# Patient Record
Sex: FEMALE | Race: WHITE | NOT HISPANIC OR LATINO
[De-identification: names, ages, dates, MRNs, and addresses within clinical notes are randomized per-mention and may not be internally consistent; named-entity substitution may affect disease eponyms.]

---

## 2017-07-30 ENCOUNTER — FORM ENCOUNTER (OUTPATIENT)
Age: 47
End: 2017-07-30

## 2017-08-08 ENCOUNTER — FORM ENCOUNTER (OUTPATIENT)
Age: 47
End: 2017-08-08

## 2018-04-25 ENCOUNTER — FORM ENCOUNTER (OUTPATIENT)
Age: 48
End: 2018-04-25

## 2018-06-21 ENCOUNTER — FORM ENCOUNTER (OUTPATIENT)
Age: 48
End: 2018-06-21

## 2019-02-05 ENCOUNTER — FORM ENCOUNTER (OUTPATIENT)
Age: 49
End: 2019-02-05

## 2019-05-19 ENCOUNTER — FORM ENCOUNTER (OUTPATIENT)
Age: 49
End: 2019-05-19

## 2019-07-16 ENCOUNTER — FORM ENCOUNTER (OUTPATIENT)
Age: 49
End: 2019-07-16

## 2019-07-22 ENCOUNTER — FORM ENCOUNTER (OUTPATIENT)
Age: 49
End: 2019-07-22

## 2019-07-28 ENCOUNTER — FORM ENCOUNTER (OUTPATIENT)
Age: 49
End: 2019-07-28

## 2019-08-08 ENCOUNTER — FORM ENCOUNTER (OUTPATIENT)
Age: 49
End: 2019-08-08

## 2019-08-19 ENCOUNTER — FORM ENCOUNTER (OUTPATIENT)
Age: 49
End: 2019-08-19

## 2019-08-20 ENCOUNTER — FORM ENCOUNTER (OUTPATIENT)
Age: 49
End: 2019-08-20

## 2019-08-22 ENCOUNTER — FORM ENCOUNTER (OUTPATIENT)
Age: 49
End: 2019-08-22

## 2019-08-26 ENCOUNTER — FORM ENCOUNTER (OUTPATIENT)
Age: 49
End: 2019-08-26

## 2019-08-28 ENCOUNTER — FORM ENCOUNTER (OUTPATIENT)
Age: 49
End: 2019-08-28

## 2019-09-02 ENCOUNTER — FORM ENCOUNTER (OUTPATIENT)
Age: 49
End: 2019-09-02

## 2019-09-04 ENCOUNTER — FORM ENCOUNTER (OUTPATIENT)
Age: 49
End: 2019-09-04

## 2020-05-31 ENCOUNTER — FORM ENCOUNTER (OUTPATIENT)
Age: 50
End: 2020-05-31

## 2020-09-29 ENCOUNTER — FORM ENCOUNTER (OUTPATIENT)
Age: 50
End: 2020-09-29

## 2021-02-23 PROBLEM — Z00.00 ENCOUNTER FOR PREVENTIVE HEALTH EXAMINATION: Status: ACTIVE | Noted: 2021-02-23

## 2021-02-24 ENCOUNTER — NON-APPOINTMENT (OUTPATIENT)
Age: 51
End: 2021-02-24

## 2021-02-25 ENCOUNTER — NON-APPOINTMENT (OUTPATIENT)
Age: 51
End: 2021-02-25

## 2021-02-26 ENCOUNTER — NON-APPOINTMENT (OUTPATIENT)
Age: 51
End: 2021-02-26

## 2021-03-01 ENCOUNTER — NON-APPOINTMENT (OUTPATIENT)
Age: 51
End: 2021-03-01

## 2021-03-18 ENCOUNTER — APPOINTMENT (OUTPATIENT)
Dept: BREAST CENTER | Facility: CLINIC | Age: 51
End: 2021-03-18

## 2021-04-15 ENCOUNTER — APPOINTMENT (OUTPATIENT)
Dept: BREAST CENTER | Facility: CLINIC | Age: 51
End: 2021-04-15

## 2021-05-06 ENCOUNTER — APPOINTMENT (OUTPATIENT)
Dept: BREAST CENTER | Facility: CLINIC | Age: 51
End: 2021-05-06

## 2021-07-02 ENCOUNTER — NON-APPOINTMENT (OUTPATIENT)
Age: 51
End: 2021-07-02

## 2021-07-07 ENCOUNTER — NON-APPOINTMENT (OUTPATIENT)
Age: 51
End: 2021-07-07

## 2021-07-07 ENCOUNTER — APPOINTMENT (OUTPATIENT)
Dept: BREAST CENTER | Facility: CLINIC | Age: 51
End: 2021-07-07

## 2021-07-21 ENCOUNTER — APPOINTMENT (OUTPATIENT)
Dept: BREAST CENTER | Facility: CLINIC | Age: 51
End: 2021-07-21
Payer: MEDICAID

## 2021-07-21 ENCOUNTER — TRANSCRIPTION ENCOUNTER (OUTPATIENT)
Age: 51
End: 2021-07-21

## 2021-07-21 DIAGNOSIS — R92.8 OTHER ABNORMAL AND INCONCLUSIVE FINDINGS ON DIAGNOSTIC IMAGING OF BREAST: ICD-10-CM

## 2021-07-21 DIAGNOSIS — R92.1 MAMMOGRAPHIC CALCIFICATION FOUND ON DIAGNOSTIC IMAGING OF BREAST: ICD-10-CM

## 2021-07-21 PROCEDURE — 99213 OFFICE O/P EST LOW 20 MIN: CPT

## 2021-07-21 RX ORDER — CHROMIUM 200 MCG
TABLET ORAL
Refills: 0 | Status: ACTIVE | COMMUNITY

## 2021-07-25 NOTE — ASSESSMENT
[FreeTextEntry1] : 50yo female previously seen by Dr. Burris last seen by Dr. Tolbert 9/30/20 s/p L lumpx & SNBX 8/9/19 for 1.2cm IDC ER/[place]+, HER2(-), (0/3) LN, (+) superior margin, s/p L Re-excision - no residual carcinoma, Oncotype 9, no RT or AI; FHx probable breast ca mother 39. L DXMG 2/22/21 showed increased nonlayering L calcifications for which stereo bx was recommended and not performed f/u [facility] & US 6/24/21 showed the microcalcs in two areas were pleomorphic in distribution and had increased in size with UOQ microcalcs measuring 5cm x 5cm and posterior cluster at 3:00 measuring 2.5cm. Stereo bx was once again recommended.  \par \par Informed patient about recommendation for biopsy. Patient asked if we could perform the biopsy in office today but informed her that the type of biopsy that was recommended is a stereotactic bx and must be performed by a radiologist with mammographic guidance. Expressed the urgency of the matter to the patient especially given her personal history of breast cancer. Patient states that she will proceed with the biopsy but does however note that she is going to be away for the month of August and the majority of September so she will get it done in the end of September 2021. Informed patient that biopsy must be performed in order for us to know whether or not she has a new breast cancer and discussed the gravity of the situation. Patient verbalized understanding and states that she will proceed with the bx as instructed. \par \par Patient is to have L Stereo bx and return after bx for reexamination.\par \par Patient was previously sent two certified letters informing her that she had not proceeded with recommended follow up. Patient was upset about having received mail to her home address and would like to no longer receive letters directly to her address because she lives in an apartment building and is afraid that someone else will receive her mail. Patient noted that if results must be sent to her that she would prefer if they were sent to her PCP Dr. Ivonne Coleman (Office # [phone] and Fax# [phone].

## 2021-07-25 NOTE — DATA REVIEWED
[FreeTextEntry1] : 7/17/19: L US bx 11:00: 0.1cm IDC well differentiated ER+ (90%), MT+ (90%), HER2 1+ (-)  \par \par 8/9/19: L NLoc Lump & SNBx:1.2cm IDC well differentiated, multiple coalescing foci, LCIS, classic type/DEEP II, ADH, FEA, ER+ (90%), MT+ (90%), HER2 1+ (-); (0/3) LN (+) superior margin, 0.7cm from medial margin, 1cm from remaining margin  \par \par 8/23/19: L Re-excision Lumpx: no residual carcinoma  \par \par 6/1/20: [facility] & US: MG - L - central upper post-op changes, stable probably benign central and UOQ grouped calcs; US - L - 0.4cm cyst w/ inspissated debris 12:30 4FN, 0.4cm cyst w/ inspissated debris 1:00 7FN. BI-RADS 3 \par \par 2/22/21: L DXMG & US: heterogeneously dense, L – central outer posterior calcs spanning 2.5cm slightly increased in number and do no definitively layer. BIRADS 4 – Stereo bx rec \par \par 6/24/21: [facility] & US ([facility]): heterogeneously dense, L – post-op architectural distortion UOQ, two clusters of microcalcs one in UOQ and second at 3:00 which are characterized as pleomorphi and vary in shape, size, and density, one or two linear calcs may represent suspicious ductal casting vs. Milk of calcium, UOQ segment of calcs measures 5cm x 5cm, posterior cluster at 3:00 measures 2.5cm x 1.5cm x 1.2cm, these calcs have increased in size since 2019 when biopsy was initially recommended, US – L – tiny cyst UOQ. BIRADS 4.

## 2021-07-25 NOTE — PHYSICAL EXAM
[Supple] : supple [No Supraclavicular Adenopathy] : no supraclavicular adenopathy [No Cervical Adenopathy] : no cervical adenopathy [Examined in the supine and seated position] : examined in the supine and seated position [No dominant masses] : no dominant masses in right breast  [No dominant masses] : no dominant masses left breast [No Nipple Retraction] : no left nipple retraction [No Nipple Discharge] : no left nipple discharge [No Axillary Lymphadenopathy] : no left axillary lymphadenopathy [de-identified] : well-healed incision

## 2021-07-25 NOTE — HISTORY OF PRESENT ILLNESS
[FreeTextEntry1] : 50yo female previously seen by Dr. Burris last seen by Dr. Tolbert 9/30/20 s/p L lumpx & SNBX 8/9/19 for 1.2cm IDC ER/[place]+, HER2(-), (0/3) LN, (+) superior margin, s/p L Re-excision - no residual carcinoma, Oncotype 9, no RT or AI; FHx probable breast ca mother 39. L DXMG 2/22/21 showed increased nonlayering L calcifications for which stereo bx was recommended and not performed f/u [facility] & US 6/24/21 showed the microcalcs in two areas were pleomorphic in distribution and had increased in size with UOQ microcalcs measuring 5cm x 5cm and posterior cluster at 3:00 measuring 2.5cm. Stereo bx was once again recommended.  \par \par

## 2021-10-08 ENCOUNTER — RESULT REVIEW (OUTPATIENT)
Age: 51
End: 2021-10-08

## 2021-10-13 ENCOUNTER — NON-APPOINTMENT (OUTPATIENT)
Age: 51
End: 2021-10-13

## 2021-10-13 DIAGNOSIS — R89.7 ABNORMAL HISTOLOGICAL FINDINGS IN SPECIMENS FROM OTHER ORGANS, SYSTEMS AND TISSUES: ICD-10-CM

## 2021-10-22 ENCOUNTER — NON-APPOINTMENT (OUTPATIENT)
Age: 51
End: 2021-10-22

## 2021-10-25 ENCOUNTER — NON-APPOINTMENT (OUTPATIENT)
Age: 51
End: 2021-10-25

## 2021-10-27 ENCOUNTER — NON-APPOINTMENT (OUTPATIENT)
Age: 51
End: 2021-10-27

## 2021-11-02 ENCOUNTER — NON-APPOINTMENT (OUTPATIENT)
Age: 51
End: 2021-11-02

## 2021-11-04 ENCOUNTER — RESULT REVIEW (OUTPATIENT)
Age: 51
End: 2021-11-04

## 2021-11-04 ENCOUNTER — NON-APPOINTMENT (OUTPATIENT)
Age: 51
End: 2021-11-04

## 2021-11-09 ENCOUNTER — NON-APPOINTMENT (OUTPATIENT)
Age: 51
End: 2021-11-09

## 2021-11-10 ENCOUNTER — APPOINTMENT (OUTPATIENT)
Dept: BREAST CENTER | Facility: CLINIC | Age: 51
End: 2021-11-10
Payer: MEDICAID

## 2021-11-10 VITALS — OXYGEN SATURATION: 97 % | BODY MASS INDEX: 21.07 KG/M2 | HEIGHT: 68 IN | HEART RATE: 92 BPM | WEIGHT: 139 LBS

## 2021-11-10 PROCEDURE — 99214 OFFICE O/P EST MOD 30 MIN: CPT

## 2021-11-14 NOTE — PHYSICAL EXAM
[Supple] : supple [No Supraclavicular Adenopathy] : no supraclavicular adenopathy [No Cervical Adenopathy] : no cervical adenopathy [Examined in the supine and seated position] : examined in the supine and seated position [No dominant masses] : no dominant masses in right breast  [No dominant masses] : no dominant masses left breast [No Nipple Retraction] : no left nipple retraction [No Nipple Discharge] : no left nipple discharge [No Axillary Lymphadenopathy] : no left axillary lymphadenopathy [Normocephalic] : normocephalic [de-identified] : well-healed incision

## 2021-11-14 NOTE — DATA REVIEWED
[FreeTextEntry1] : 7/17/19: L US bx 11:00: 0.1cm IDC well differentiated ER+ (90%), W[place]+ (90%), HER2 1+ (-)  \par \par 8/9/19: L NLoc Lump & SNBx:1.2cm IDC well differentiated, multiple coalescing foci, LCIS, classic type/DEEP II, ADH, FEA, ER+ (90%), W[place]+ (90%), HER2 1+ (-); (0/3) LN (+) superior margin, 0.7cm from medial margin, 1cm from remaining margin  \par \par 8/23/19: L Re-excision Lumpx: no residual carcinoma  \par \par 6/1/20: [facility] & US: MG - L - central upper post-op changes, stable probably benign central and UOQ grouped calcs; US - L - 0.4cm cyst w/ inspissated debris 12:30 4FN, 0.4cm cyst w/ inspissated debris 1:00 7FN. BI-RADS 3 \par \par 2/22/21: L DXMG & US: heterogeneously dense, L – central outer posterior calcs spanning 2.5cm slightly increased in number and do no definitively layer. BIRADS 4 – Stereo bx rec \par \par 6/24/21: [facility] & US ([facility]): heterogeneously dense, L – post-op architectural distortion UOQ, two clusters of microcalcs one in UOQ and second at 3:00 which are characterized as pleomorphi and vary in shape, size, and density, one or two linear calcs may represent suspicious ductal casting vs. Milk of calcium, UOQ segment of calcs measures 5cm x 5cm, posterior cluster at 3:00 measures 2.5cm x 1.5cm x 1.2cm, these calcs have increased in size since 2019 when biopsy was initially recommended, US – L – tiny cyst UOQ. BIRADS 4.  \par \par 10/8/2021 ([facility]) Left breast UOQ posterior pathology: - Ductal carcinoma in situ (DCIS), cribriform type with high nuclear grade and marked necrosis, extending into lobules - Calcifications associated with DCIS ER 75%, W[place] 5-10%. Left breast UOQ anterior pathology: - Atypical ductal hyperplasia (ADH) and flat epithelial atypia (FEA) - Calcifications associated with atypical epithelium\par \par 10/21/2021 ([facility]) bilateral breast MRI showing 1. Suspicious 1 cm region of clumped nonmass enhancement at the right 10:00 axis middle to posterior third for which MR guided core biopsy is recommended.\par 2. Known recently diagnosed left breast recurrent neoplasm with DCIS at the left 4:00 axis spanning a 2.5 cm region and atypical ductal hyperplasia approaching malignancy at the left 2:00 axis along the superior anterior aspect of the prior lumpectomy site. Additional highly suspicious 1.4 cm region of clumped nonmass enhancement at the left 12:00 axis anterior third is located 2.5 cm superior to the anterior biopsy site. If breast conservation is being entertained then MR guided core biopsy is recommended. Additional indeterminate 4 mm focus of enhancement at the left 8:00 axis and faint 2 cm region of linear nonmass enhancement at the left 1-2:00 axis anterior third, for which the management will be based on the biopsy result of the dominant 12:00 axis finding. If the biopsy result is found to be malignant then an additional biopsy will likely not . If the biopsy result is found to be benign and breast conservation is still being entertained, then additional MR biopsy may be warranted. \par 1. MR biopsy of the right 10:00 axis 1 cm clumped nonmass enhancement.\par 2. If clinically warranted, MR biopsy of the left 12:00 axis highly suspicious 1.4 cm clumped nonmass enhancement.\par 3. Pending the results of the left MR biopsy will determine whether there is a need for an additional MR biopsy.\par 4. Appropriate action should be taken for the left breast cancer. The patient is under the surgical management referring clinician for her recurrent left breast cancer.\par BIRADS 4 \par \par 11/5/2021 (addendum 11/9/21 incl path results)  MR GUIDED BILATERAL BREAST BIOPSY 2 SITES ([facility]): \par The result in the right breast 10:00 position is benign breast tissue with stromal fibrosis. This is concordant with imaging findings. Follow-up bilateral breast MRI with and without intravenous contrast is recommended in 6 months.\par The result in the left breast 12:00 position is ductal carcinoma in situ (DCIS), cribriform type with high nuclear grade and necrosis, extending into lobules and rare calcifications associated with DCIS and surrounding epithelium. This is malignant and is concordant with imaging findings. Surgical consultation is recommended. ADH\par \par \par

## 2021-11-14 NOTE — HISTORY OF PRESENT ILLNESS
[FreeTextEntry1] : 51-yo female presents for f/up visit. LOV was 7/21/21. She is s/p MR-guided biopsy on 11/5/21 yielding left DCIS at 12:00, and benign findings to R breast at 10:00.  She is also s/p stereotactic biopsy on 10/8/21 yielding DCIS and ADH to left breast.\par \par She was previously seen by Dr. Burris last seen by Dr. Tolbert 9/30/20 s/p L lumpx & SNBX 8/9/19 for 1.2cm IDC ER/SC+, HER2(-), (0/3) LN, (+) superior margin, s/p L Re-excision - no residual carcinoma, Oncotype 9, no RT or AI; FHx probable breast ca mother 39. L DXMG 2/22/21 showed increased nonlayering L calcifications for which stereo bx was recommended and not performed f/u [facility] & US 6/24/21 showed the microcalcs in two areas were pleomorphic in distribution and had increased in size with UOQ microcalcs measuring 5cm x 5cm and posterior cluster at 3:00 measuring 2.5cm. Stereo bx was once again recommended.\par \par

## 2021-11-14 NOTE — ASSESSMENT
[FreeTextEntry1] : 51-yo female presents for f/up visit. LOV was 7/21/21. She is s/p MR-guided biopsy on 11/5/21 yielding left DCIS at 12:00, and benign findings to R breast at 10:00.  She is also s/p stereotactic biopsy on 10/8/21 yielding DCIS and ADH to left breast.\par \par She was previously seen by Dr. Burris last seen by Dr. Tolbert 9/30/20 s/p L lumpx & SNBX 8/9/19 for 1.2cm IDC ER/[place]+, HER2(-), (0/3) LN, (+) superior margin, s/p L Re-excision - no residual carcinoma, Oncotype 9, no RT or AI; FHx probable breast ca mother 39. L DXMG 2/22/21 showed increased nonlayering L calcifications for which stereo bx was recommended and not performed f/u [facility] & US 6/24/21 showed the microcalcs in two areas were pleomorphic in distribution and had increased in size with UOQ microcalcs measuring 5cm x 5cm and posterior cluster at 3:00 measuring 2.5cm. Stereo bx was once again recommended.\par \par Reviewed and discussed pathology and imaging results. Advised patient that 3 areas in the left breast are to be excised. \par \par Surgical options were reviewed including a lumpectomy to negative margins, with whole breast radiation therapy versus a mastectomy with or without reconstruction.  Informed patient that in her case, a lumpectomy may not be ideal given the extent of the area involved, and that further surgery may be needed if margins are not clear.  Informed patient that she may have a drain after surgery, which would be removed at her post-op visit.\par \par Patient questioned whether site of ADH must be removed. I informed her that it must be removed and I provided education on this, with the use of a diagram illustrating the small difference between ADH and DCIS. \par \par Patient does not want to have a SLNB. Given her previous history of left SLNB with 0/3 results, it is reasonable to not do it at this time.\par  \par Indications for adjuvant radiation therapy were discussed including the potential need for post-operative whole breast radiation, which on average ranges from 5 weekly sessions for 3-6 weeks.\par \par All questions were answered.  Pre-op education was provided. Consent was obtained.  Patient also met with Kayleen (nurse navigator) and Angela (.)\par

## 2021-12-06 ENCOUNTER — APPOINTMENT (OUTPATIENT)
Dept: MAMMOGRAPHY | Facility: HOSPITAL | Age: 51
End: 2021-12-06

## 2021-12-06 ENCOUNTER — APPOINTMENT (OUTPATIENT)
Dept: MAMMOGRAPHY | Facility: HOSPITAL | Age: 51
End: 2021-12-06
Payer: MEDICAID

## 2021-12-06 ENCOUNTER — RESULT REVIEW (OUTPATIENT)
Age: 51
End: 2021-12-06

## 2021-12-06 ENCOUNTER — LABORATORY RESULT (OUTPATIENT)
Age: 51
End: 2021-12-06

## 2021-12-06 ENCOUNTER — OUTPATIENT (OUTPATIENT)
Dept: OUTPATIENT SERVICES | Facility: HOSPITAL | Age: 51
LOS: 1 days | End: 2021-12-06
Payer: COMMERCIAL

## 2021-12-06 PROCEDURE — 19281 PERQ DEVICE BREAST 1ST IMAG: CPT | Mod: LT

## 2021-12-06 PROCEDURE — 19281 PERQ DEVICE BREAST 1ST IMAG: CPT

## 2021-12-06 PROCEDURE — 19282 PERQ DEVICE BREAST EA IMAG: CPT

## 2021-12-06 PROCEDURE — C1889: CPT

## 2021-12-06 PROCEDURE — 19282 PERQ DEVICE BREAST EA IMAG: CPT | Mod: LT

## 2021-12-08 ENCOUNTER — TRANSCRIPTION ENCOUNTER (OUTPATIENT)
Age: 51
End: 2021-12-08

## 2021-12-09 ENCOUNTER — RESULT REVIEW (OUTPATIENT)
Age: 51
End: 2021-12-09

## 2021-12-09 ENCOUNTER — OUTPATIENT (OUTPATIENT)
Dept: OUTPATIENT SERVICES | Facility: HOSPITAL | Age: 51
LOS: 1 days | Discharge: HOME | End: 2021-12-09
Payer: MEDICAID

## 2021-12-09 ENCOUNTER — APPOINTMENT (OUTPATIENT)
Dept: BREAST CENTER | Facility: CLINIC | Age: 51
End: 2021-12-09

## 2021-12-09 PROCEDURE — 76098 X-RAY EXAM SURGICAL SPECIMEN: CPT | Mod: 26

## 2021-12-09 PROCEDURE — 88307 TISSUE EXAM BY PATHOLOGIST: CPT | Mod: 26

## 2021-12-09 PROCEDURE — 19301 PARTIAL MASTECTOMY: CPT | Mod: LT

## 2021-12-09 PROCEDURE — 19316 MASTOPEXY: CPT | Mod: LT

## 2021-12-09 PROCEDURE — 88305 TISSUE EXAM BY PATHOLOGIST: CPT | Mod: 26

## 2021-12-09 RX ORDER — CEPHALEXIN 500 MG
1 CAPSULE ORAL
Qty: 14 | Refills: 0
Start: 2021-12-09 | End: 2021-12-15

## 2021-12-09 RX ORDER — OXYCODONE AND ACETAMINOPHEN 5; 325 MG/1; MG/1
1 TABLET ORAL
Qty: 15 | Refills: 0
Start: 2021-12-09 | End: 2021-12-12

## 2021-12-13 ENCOUNTER — NON-APPOINTMENT (OUTPATIENT)
Age: 51
End: 2021-12-13

## 2021-12-15 ENCOUNTER — APPOINTMENT (OUTPATIENT)
Dept: BREAST CENTER | Facility: CLINIC | Age: 51
End: 2021-12-15
Payer: MEDICAID

## 2021-12-15 VITALS — BODY MASS INDEX: 21.07 KG/M2 | OXYGEN SATURATION: 100 % | HEIGHT: 68 IN | HEART RATE: 91 BPM | WEIGHT: 139 LBS

## 2021-12-15 PROCEDURE — 99024 POSTOP FOLLOW-UP VISIT: CPT

## 2021-12-22 ENCOUNTER — APPOINTMENT (OUTPATIENT)
Dept: BREAST CENTER | Facility: CLINIC | Age: 51
End: 2021-12-22

## 2021-12-24 NOTE — DATA REVIEWED
[FreeTextEntry1] : 7/17/19: L US bx 11:00: 0.1cm IDC well differentiated ER+ (90%), [place]+ (90%), HER2 1+ (-)  \par \par 8/9/19: L NLoc Lump & SNBx:1.2cm IDC well differentiated, multiple coalescing foci, LCIS, classic type/DEEP II, ADH, FEA, ER+ (90%), NV+ (90%), HER2 1+ (-); (0/3) LN (+) superior margin, 0.7cm from medial margin, 1cm from remaining margin  \par \par 8/23/19: L Re-excision Lumpx: no residual carcinoma  \par \par 6/1/20: [facility] DXMG & US: MG - L - central upper post-op changes, stable probably benign central and UOQ grouped calcs; US - L - 0.4cm cyst w/ inspissated debris 12:30 4FN, 0.4cm cyst w/ inspissated debris 1:00 7FN. BI-RADS 3 \par \par 2/22/21: L DXMG & US: heterogeneously dense, L – central outer posterior calcs spanning 2.5cm slightly increased in number and do no definitively layer. BIRADS 4 – Stereo bx rec \par \par 6/24/21: [facility] DXMG & US ([facility]): heterogeneously dense, L – post-op architectural distortion UOQ, two clusters of microcalcs one in UOQ and second at 3:00 which are characterized as pleomorphi and vary in shape, size, and density, one or two linear calcs may represent suspicious ductal casting vs. Milk of calcium, UOQ segment of calcs measures 5cm x 5cm, posterior cluster at 3:00 measures 2.5cm x 1.5cm x 1.2cm, these calcs have increased in size since 2019 when biopsy was initially recommended, US – L – tiny cyst UOQ. BIRADS 4.  \par \par 10/8/2021 ([facility]) Left breast UOQ posterior pathology: - Ductal carcinoma in situ (DCIS), cribriform type with high nuclear grade and marked necrosis, extending into lobules - Calcifications associated with DCIS ER 75%, [place] 5-10%. Left breast UOQ anterior pathology: - Atypical ductal hyperplasia (ADH) and flat epithelial atypia (FEA) - Calcifications associated with atypical epithelium\par \par 10/21/2021 ([facility]) bilateral breast MRI showing 1. Suspicious 1 cm region of clumped nonmass enhancement at the right 10:00 axis middle to posterior third for which MR guided core biopsy is recommended.\par 2. Known recently diagnosed left breast recurrent neoplasm with DCIS at the left 4:00 axis spanning a 2.5 cm region and atypical ductal hyperplasia approaching malignancy at the left 2:00 axis along the superior anterior aspect of the prior lumpectomy site. Additional highly suspicious 1.4 cm region of clumped nonmass enhancement at the left 12:00 axis anterior third is located 2.5 cm superior to the anterior biopsy site. If breast conservation is being entertained then MR guided core biopsy is recommended. Additional indeterminate 4 mm focus of enhancement at the left 8:00 axis and faint 2 cm region of linear nonmass enhancement at the left 1-2:00 axis anterior third, for which the management will be based on the biopsy result of the dominant 12:00 axis finding. If the biopsy result is found to be malignant then an additional biopsy will likely not . If the biopsy result is found to be benign and breast conservation is still being entertained, then additional MR biopsy may be warranted. \par 1. MR biopsy of the right 10:00 axis 1 cm clumped nonmass enhancement.\par 2. If clinically warranted, MR biopsy of the left 12:00 axis highly suspicious 1.4 cm clumped nonmass enhancement.\par 3. Pending the results of the left MR biopsy will determine whether there is a need for an additional MR biopsy.\par 4. Appropriate action should be taken for the left breast cancer. The patient is under the surgical management referring clinician for her recurrent left breast cancer.\par BIRADS 4 \par \par 11/5/2021 (addendum 11/9/21 incl path results)  MR GUIDED BILATERAL BREAST BIOPSY 2 SITES ([facility]): \par The result in the right breast 10:00 position is benign breast tissue with stromal fibrosis. This is concordant with imaging findings. Follow-up bilateral breast MRI with and without intravenous contrast is recommended in 6 months.\par The result in the left breast 12:00 position is ductal carcinoma in situ (DCIS), cribriform type with high nuclear grade and necrosis, extending into lobules and rare calcifications associated with DCIS and surrounding epithelium. This is malignant and is concordant with imaging findings. Surgical consultation is recommended. ADH\par \par 12/9/2021 ([facility]) . Left breast, upper outer quadrant, excision:\par - Ductal carcinoma in situ (DCIS) solid and cribriform types with high\par nuclear grade, necrosis, extending into lobules, multiple foci.\par - Margins, as present on this specimen:Inferior: Positive Medial: Positive Anterior: Positive Superior: 1.5 mm All remaining margins: > 10 mm - Lobular carcinoma in situ (LCIS), atypical lobular hyperplasia (ALH) and flat epithelial atypia (FEA). - Biopsy site changes x 3 - Calcifications associated with DCIS and benign epithelium. 2. Left breast, new inferior medial margin: - Benign breast tissue.\par

## 2021-12-24 NOTE — PHYSICAL EXAM
[Normocephalic] : normocephalic [Supple] : supple [No Supraclavicular Adenopathy] : no supraclavicular adenopathy [No Cervical Adenopathy] : no cervical adenopathy [Examined in the supine and seated position] : examined in the supine and seated position [No dominant masses] : no dominant masses in right breast  [No dominant masses] : no dominant masses left breast [No Nipple Retraction] : no left nipple retraction [No Nipple Discharge] : no left nipple discharge [No Axillary Lymphadenopathy] : no left axillary lymphadenopathy [de-identified] : well-healed incision

## 2021-12-24 NOTE — ASSESSMENT
[FreeTextEntry1] : Patient presents for post-operative evaluation of left multifocal DCIS and atypical ductal hyperplasia s/p localized lumpectomy x 3 on 12/9/2021. Discussed final surgical pathology in detail with findings of positive inferior, medial, and superior margins, which warrants a mastectomy given the extent of remaining disease in the breast. \par \par \par Recommend consultation with plastic surgeon to review reconstructive options, referred to Dr. Jose. Patient will proceed with plastic surgery consult and will follow up in 6 weeks to plan for mastectomy. \par \par HERNANDEZ drain removed at bedside, tolerated well. Post-operative instructions reviewed.

## 2021-12-24 NOTE — HISTORY OF PRESENT ILLNESS
[FreeTextEntry1] : 51-yo female presents for post-operative evaluation of left 12:00 DCIS and atypical ductal hyperplasia s/p localized lumpectomy x 3 on 12/9/2021. She is doing well, denies pain. Minimal serosanguineous output from left breast HERNANDEZ drain < 10mL per 24 hours x 2 days. \par \par She was previously seen by Dr. Burris last seen by Dr. Tolbert 9/30/20 s/p L lumpx & SNBX 8/9/19 for 1.2cm IDC ER/CT+, HER2(-), (0/3) LN, (+) superior margin, s/p L Re-excision - no residual carcinoma, Oncotype 9, no RT or AI; FHx probable breast ca mother 39. L DXMG 2/22/21 showed increased nonlayering L calcifications for which stereo bx was recommended and not performed f/u [facility] DXMG & US 6/24/21 showed the microcalcs in two areas were pleomorphic in distribution and had increased in size with UOQ microcalcs measuring 5cm x 5cm and posterior cluster at 3:00 measuring 2.5cm. Stereo bx was once again recommended.\par \par s/p triple localized excision left breast with mastopexy repair on 12/9/2021:\par 12/9/2021 ([facility]) . Left breast, upper outer quadrant, excision:\par - Ductal carcinoma in situ (DCIS) solid and cribriform types with high\par nuclear grade, necrosis, extending into lobules, multiple foci.\par - Margins, as present on this specimen:Inferior: Positive Medial: Positive Anterior: Positive Superior: 1.5 mm All remaining margins: > 10 mm - Lobular carcinoma in situ (LCIS), atypical lobular hyperplasia (ALH) and flat epithelial atypia (FEA). - Biopsy site changes x 3 - Calcifications associated with DCIS and benign epithelium. 2. Left breast, new inferior medial margin: - Benign breast tissue.\par \par \par \par

## 2021-12-28 ENCOUNTER — APPOINTMENT (OUTPATIENT)
Dept: PLASTIC SURGERY | Facility: CLINIC | Age: 51
End: 2021-12-28
Payer: MEDICAID

## 2021-12-28 VITALS — HEIGHT: 68 IN | WEIGHT: 139 LBS | OXYGEN SATURATION: 98 % | BODY MASS INDEX: 21.07 KG/M2 | HEART RATE: 89 BPM

## 2021-12-28 DIAGNOSIS — Z78.9 OTHER SPECIFIED HEALTH STATUS: ICD-10-CM

## 2021-12-28 PROCEDURE — 99204 OFFICE O/P NEW MOD 45 MIN: CPT

## 2021-12-28 NOTE — ASSESSMENT
[FreeTextEntry1] : I reviewed with Ms. CARDONA in detail the risks, benefits, and alternatives of both implant based breast reconstruction as well as autologous tissue reconstruction.\par \par Specifically, I explained to her than an implant reconstruction usually consists of two separate stages with two surgeries spaced about 4 months apart. At the time of the mastectomy, a tissue expander is placed underneath the pectoralis muscle or on top of the pectoralis muscle and is often reinforced with biologic mesh - acellular dermal matrix.  We expand the tissue expander secondarily in the office by injecting saline into the implant percutaneously until the desired size breast mound is achieved. At that point, a second stage operation is scheduled where we take her back to the operating room and remove the tissue expander and place a permanent breast implant. The permanent prosthesis can be either silicone or saline. The first stage of the reconstruction is approximately 3 hours for one breast and 4-5 hours for a bilateral procedure, with a 1-2 night hospital stay and a four-week recovery. The second stage surgery is an outpatient procedure with a two-week recovery. I reviewed with her the risks of implant reconstruction including, but not limited to, bleeding, scarring, implant infection, implant rupture, capsule contracture, implant malposition, asymmetry, contour abnormality, and need for revision surgeries. I also discussed the FDA recommendations for silicone implant rupture screening with MRI. \par \par \par \par

## 2021-12-28 NOTE — CONSULT LETTER
[Consult Letter:] : I had the pleasure of evaluating your patient, [unfilled]. [Please see my note below.] : Please see my note below. [Consult Closing:] : Thank you very much for allowing me to participate in the care of this patient.  If you have any questions, please do not hesitate to contact me. [Sincerely,] : Sincerely, [FreeTextEntry2] : Gee Hurtado MD [FreeTextEntry3] : Oren Lerman, MD, FACS\par Cosmetic & Reconstructive Plastic Surgery\par Director, Aesthetic and Reconstructive Breast Surgery Fellowship - [facility]\par Associate Professor of Surgery, [facility]\par Past President, [place] [employer]\par Tel: [phone]\par Fax: [phone]\par [email or web address]\par

## 2021-12-28 NOTE — PHYSICAL EXAM
[Bra Size: _______] : Bra Size: [unfilled] [de-identified] : R>L, circumareolar purse string incision healing well, no nipple retraction, no nipple discharge, grade II ptosis on right, NAC on let 2/5 cm higher than right, induration and postop edema on left breast [de-identified] : inadequate tissue for autologous reconstruction

## 2021-12-28 NOTE — HISTORY OF PRESENT ILLNESS
[FreeTextEntry1] : 52 y/o female with left breast cancer referred by Dr. Hurtado presents for initial consultation to discuss left breast reconstruction options following planned left mastectomy. No history of chemotherapy or radiation. No history of genetic testing. Patient had a lumpectomy with Dr. Hurtado on 12/9/21. No history of bleeding/clotting disorders or DVT. No family history of breast cancer.

## 2022-01-12 ENCOUNTER — APPOINTMENT (OUTPATIENT)
Dept: BREAST CENTER | Facility: CLINIC | Age: 52
End: 2022-01-12
Payer: MEDICAID

## 2022-01-12 VITALS
SYSTOLIC BLOOD PRESSURE: 138 MMHG | DIASTOLIC BLOOD PRESSURE: 79 MMHG | WEIGHT: 139 LBS | HEIGHT: 68 IN | HEART RATE: 84 BPM | BODY MASS INDEX: 21.07 KG/M2 | OXYGEN SATURATION: 96 %

## 2022-01-12 DIAGNOSIS — Z80.3 FAMILY HISTORY OF MALIGNANT NEOPLASM OF BREAST: ICD-10-CM

## 2022-01-12 PROCEDURE — 93702 BIS XTRACELL FLUID ANALYSIS: CPT

## 2022-01-12 PROCEDURE — 99024 POSTOP FOLLOW-UP VISIT: CPT

## 2022-01-12 NOTE — ASSESSMENT
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 She is here for pre-operative appointment pending L total mastectomy, reconstruction with Dr. Lerman. She is doing well, denies pain. Physical exam WNL, patient will proceed with L total mastectomy, SLNB, and reconstruction. Surgical consent obtained today. Pt will RTC for postoperative appointment. Sabrazo taken today 1.5. Patient verbalized understanding and agreement of plan. \par

## 2022-01-12 NOTE — REASON FOR VISIT
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

## 2022-01-12 NOTE — DATA REVIEWED
[FreeTextEntry1] : 7/17/19: L US bx 11:00: 0.1cm IDC well differentiated ER+ (90%), NC+ (90%), HER2 1+ (-)  \par \par 8/9/19: L NLoc Lump & SNBx:1.2cm IDC well differentiated, multiple coalescing foci, LCIS, classic type/DEEP II, ADH, FEA, ER+ (90%), NC+ (90%), HER2 1+ (-); (0/3) LN (+) superior margin, 0.7cm from medial margin, 1cm from remaining margin  \par \par 8/23/19: L Re-excision Lumpx: no residual carcinoma  \par \par 6/1/20: [facility] & US: MG - L - central upper post-op changes, stable probably benign central and UOQ grouped calcs; US - L - 0.4cm cyst w/ inspissated debris 12:30 4FN, 0.4cm cyst w/ inspissated debris 1:00 7FN. BI-RADS 3 \par \par 2/22/21: L DXMG & US: heterogeneously dense, L – central outer posterior calcs spanning 2.5cm slightly increased in number and do no definitively layer. BIRADS 4 – Stereo bx rec \par \par 6/24/21: [facility] & US ([facility]): heterogeneously dense, L – post-op architectural distortion UOQ, two clusters of microcalcs one in UOQ and second at 3:00 which are characterized as pleomorphi and vary in shape, size, and density, one or two linear calcs may represent suspicious ductal casting vs. Milk of calcium, UOQ segment of calcs measures 5cm x 5cm, posterior cluster at 3:00 measures 2.5cm x 1.5cm x 1.2cm, these calcs have increased in size since 2019 when biopsy was initially recommended, US – L – tiny cyst UOQ. BIRADS 4.  \par \par 10/8/2021 ([facility]) Left breast UOQ posterior pathology: - Ductal carcinoma in situ (DCIS), cribriform type with high nuclear grade and marked necrosis, extending into lobules - Calcifications associated with DCIS ER 75%, NC 5-10%. Left breast UOQ anterior pathology: - Atypical ductal hyperplasia (ADH) and flat epithelial atypia (FEA) - Calcifications associated with atypical epithelium\par \par 10/21/2021 ([facility]) bilateral breast MRI showing 1. Suspicious 1 cm region of clumped nonmass enhancement at the right 10:00 axis middle to posterior third for which MR guided core biopsy is recommended.\par 2. Known recently diagnosed left breast recurrent neoplasm with DCIS at the left 4:00 axis spanning a 2.5 cm region and atypical ductal hyperplasia approaching malignancy at the left 2:00 axis along the superior anterior aspect of the prior lumpectomy site. Additional highly suspicious 1.4 cm region of clumped nonmass enhancement at the left 12:00 axis anterior third is located 2.5 cm superior to the anterior biopsy site. If breast conservation is being entertained then MR guided core biopsy is recommended. Additional indeterminate 4 mm focus of enhancement at the left 8:00 axis and faint 2 cm region of linear nonmass enhancement at the left 1-2:00 axis anterior third, for which the management will be based on the biopsy result of the dominant 12:00 axis finding. If the biopsy result is found to be malignant then an additional biopsy will likely not . If the biopsy result is found to be benign and breast conservation is still being entertained, then additional MR biopsy may be warranted. \par 1. MR biopsy of the right 10:00 axis 1 cm clumped nonmass enhancement.\par 2. If clinically warranted, MR biopsy of the left 12:00 axis highly suspicious 1.4 cm clumped nonmass enhancement.\par 3. Pending the results of the left MR biopsy will determine whether there is a need for an additional MR biopsy.\par 4. Appropriate action should be taken for the left breast cancer. The patient is under the surgical management referring clinician for her recurrent left breast cancer.\par BIRADS 4 \par \par 11/5/2021 (addendum 11/9/21 incl path results)  MR GUIDED BILATERAL BREAST BIOPSY 2 SITES ([facility]): \par The result in the right breast 10:00 position is benign breast tissue with stromal fibrosis. This is concordant with imaging findings. Follow-up bilateral breast MRI with and without intravenous contrast is recommended in 6 months.\par The result in the left breast 12:00 position is ductal carcinoma in situ (DCIS), cribriform type with high nuclear grade and necrosis, extending into lobules and rare calcifications associated with DCIS and surrounding epithelium. This is malignant and is concordant with imaging findings. Surgical consultation is recommended. ADH\par \par 12/9/2021 ([facility]) . Left breast, upper outer quadrant, excision:\par - Ductal carcinoma in situ (DCIS) solid and cribriform types with high\par nuclear grade, necrosis, extending into lobules, multiple foci.\par - Margins, as present on this specimen:Inferior: Positive Medial: Positive Anterior: Positive Superior: 1.5 mm All remaining margins: > 10 mm - Lobular carcinoma in situ (LCIS), atypical lobular hyperplasia (ALH) and flat epithelial atypia (FEA). - Biopsy site changes x 3 - Calcifications associated with DCIS and benign epithelium. 2. Left breast, new inferior medial margin: - Benign breast tissue.\par

## 2022-01-12 NOTE — PHYSICAL EXAM
[No Supraclavicular Adenopathy] : no supraclavicular adenopathy [Examined in the supine and seated position] : examined in the supine and seated position [No dominant masses] : no dominant masses in right breast  [No dominant masses] : no dominant masses left breast [No Nipple Retraction] : no left nipple retraction [No Nipple Discharge] : no left nipple discharge [No Axillary Lymphadenopathy] : no left axillary lymphadenopathy [Asymmetrical] : asymmetrical [Breast Nipple Inversion] : nipples not inverted [Breast Nipple Retraction] : nipples not retracted [Breast Nipple Flattening] : nipples not flattened [Breast Nipple Fissures] : nipples not fissured [de-identified] : well-healed incision, layer of dried dermabond adhesive over the left areola and surrounding area.

## 2022-01-12 NOTE — HISTORY OF PRESENT ILLNESS
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 She is here for pre-operative appointment pending L total mastectomy, reconstruction with Dr. Lerman. She is doing well, denies pain. Patient states there is still a layer of glue over the nipple from the surgery.\par \par She was previously seen by Dr. Burris last seen by Dr. Tolbert 9/30/20 s/p L lumpx & SNBX 8/9/19 for 1.2cm IDC ER/SD+, HER2(-), (0/3) LN, (+) superior margin, s/p L Re-excision - no residual carcinoma, Oncotype 9, no RT or AI; FHx probable breast ca mother 39. L DXMG 2/22/21 showed increased nonlayering L calcifications for which stereo bx was recommended and not performed f/u [facility] & US 6/24/21 showed the microcalcs in two areas were pleomorphic in distribution and had increased in size with UOQ microcalcs measuring 5cm x 5cm and posterior cluster at 3:00 measuring 2.5cm. Stereo bx was once again recommended.\par \par Patient's baseline sozo 1.5 today\par \par \par \par \par

## 2022-01-25 ENCOUNTER — APPOINTMENT (OUTPATIENT)
Dept: PLASTIC SURGERY | Facility: CLINIC | Age: 52
End: 2022-01-25

## 2022-01-26 ENCOUNTER — APPOINTMENT (OUTPATIENT)
Dept: BREAST CENTER | Facility: CLINIC | Age: 52
End: 2022-01-26

## 2022-02-09 ENCOUNTER — APPOINTMENT (OUTPATIENT)
Dept: PLASTIC SURGERY | Facility: CLINIC | Age: 52
End: 2022-02-09
Payer: MEDICAID

## 2022-02-09 PROCEDURE — ZZZZZ: CPT

## 2022-02-09 NOTE — HISTORY OF PRESENT ILLNESS
[Home] : at home, [unfilled] , at the time of the visit. [Other Location: e.g. Home (Enter Location, City,State)___] : at [unfilled] [Verbal consent obtained from patient] : the patient, [unfilled] [FreeTextEntry1] : Patient Name: HAILEE CARDONA \par : 1970 \par Date: 2022 \par Attending: Dr. Oren Lerman\par \par HPI: preop consultation for left breast reconstruction with tissue expander on 22.\par \par Allergies: NKDA\par Medication prescribed: bactroban, oxycodone 5 mg\par \par ROS: complete 14 point review of systems negative except pertinent items reviewed in the HPI. Other non-contributory items reviewed in our new patient questionnaire and we have submitted it to be scanned into the medical record. \par \par Physical Exam: \par completed at time of in office evaluation \par \par Assessment/Plan:\par We have discussed pre and postop instructions, recovery limitations, restrictions and expectations, ERAS protocol, NPO status, transportation home, postop medications, COVID-19 policies, benefits and risks of the procedure. Pre-op labs reviewed. The patient would like to proceed with surgery as scheduled.\par \par HIMANSHU TAYLOR NP\par \par

## 2022-02-15 ENCOUNTER — LABORATORY RESULT (OUTPATIENT)
Age: 52
End: 2022-02-15

## 2022-02-17 ENCOUNTER — TRANSCRIPTION ENCOUNTER (OUTPATIENT)
Age: 52
End: 2022-02-17

## 2022-02-17 ENCOUNTER — OUTPATIENT (OUTPATIENT)
Dept: OUTPATIENT SERVICES | Facility: HOSPITAL | Age: 52
LOS: 1 days | End: 2022-02-17
Payer: COMMERCIAL

## 2022-02-17 PROCEDURE — 78195 LYMPH SYSTEM IMAGING: CPT

## 2022-02-17 PROCEDURE — A9541: CPT

## 2022-02-17 PROCEDURE — 78195 LYMPH SYSTEM IMAGING: CPT | Mod: 26

## 2022-02-18 ENCOUNTER — APPOINTMENT (OUTPATIENT)
Dept: BREAST CENTER | Facility: CLINIC | Age: 52
End: 2022-02-18

## 2022-02-18 ENCOUNTER — RESULT REVIEW (OUTPATIENT)
Age: 52
End: 2022-02-18

## 2022-02-18 ENCOUNTER — OUTPATIENT (OUTPATIENT)
Dept: OUTPATIENT SERVICES | Facility: HOSPITAL | Age: 52
LOS: 1 days | Discharge: HOME | End: 2022-02-18
Payer: MEDICAID

## 2022-02-18 PROCEDURE — 38900 IO MAP OF SENT LYMPH NODE: CPT | Mod: 78,LT

## 2022-02-18 PROCEDURE — 19357 TISS XPNDR PLMT BRST RCNSTJ: CPT | Mod: LT

## 2022-02-18 PROCEDURE — 88307 TISSUE EXAM BY PATHOLOGIST: CPT | Mod: 26

## 2022-02-18 PROCEDURE — 19303 MAST SIMPLE COMPLETE: CPT | Mod: 78,LT

## 2022-02-18 PROCEDURE — 88331 PATH CONSLTJ SURG 1 BLK 1SPC: CPT | Mod: 26

## 2022-02-18 PROCEDURE — 38525 BIOPSY/REMOVAL LYMPH NODES: CPT | Mod: 78,LT

## 2022-02-18 PROCEDURE — 76098 X-RAY EXAM SURGICAL SPECIMEN: CPT | Mod: 26,78

## 2022-02-18 PROCEDURE — 88342 IMHCHEM/IMCYTCHM 1ST ANTB: CPT | Mod: 26

## 2022-02-18 PROCEDURE — 15777 ACELLULAR DERM MATRIX IMPLT: CPT | Mod: LT

## 2022-02-18 PROCEDURE — 76098 X-RAY EXAM SURGICAL SPECIMEN: CPT | Mod: 26

## 2022-02-18 DEVICE — IMP MESH SURGIMEND 1MM 20X10CM: Type: IMPLANTABLE DEVICE | Site: LEFT | Status: FUNCTIONAL

## 2022-02-18 DEVICE — IMPLANTABLE DEVICE: Type: IMPLANTABLE DEVICE | Site: LEFT | Status: FUNCTIONAL

## 2022-02-18 DEVICE — CLIP APPLIER ETHICON LIGACLIP 9 3/8" MEDIUM: Type: IMPLANTABLE DEVICE | Site: LEFT | Status: FUNCTIONAL

## 2022-02-22 ENCOUNTER — APPOINTMENT (OUTPATIENT)
Dept: PLASTIC SURGERY | Facility: CLINIC | Age: 52
End: 2022-02-22
Payer: MEDICAID

## 2022-02-22 ENCOUNTER — NON-APPOINTMENT (OUTPATIENT)
Age: 52
End: 2022-02-22

## 2022-02-22 LAB — SURGICAL PATHOLOGY STUDY: SIGNIFICANT CHANGE UP

## 2022-02-22 PROCEDURE — 99024 POSTOP FOLLOW-UP VISIT: CPT

## 2022-02-22 RX ORDER — OXYCODONE 5 MG/1
5 TABLET ORAL
Qty: 12 | Refills: 0 | Status: DISCONTINUED | COMMUNITY
Start: 2022-02-09 | End: 2022-02-22

## 2022-02-22 NOTE — HISTORY OF PRESENT ILLNESS
[FreeTextEntry1] : 53 y/o female presents 4 days s/p left breast reconstruction with tissue expander. Denies any f/c/n/v. Patient is taking ibuprofen 400-800 mg every 6 hrs for the pain prn. Left HERNANDEZ drain in place--> serosang fluid, not ready for removal.

## 2022-02-22 NOTE — ASSESSMENT
[FreeTextEntry1] : Postop instructions reviewed\par Sports bra\par Rx for PT given - will start in 1-2 weeks\par \par Will f/u w/ NP for drain removal\par RTC in 2 weeks for Dr. Lerman

## 2022-02-22 NOTE — PHYSICAL EXAM
[de-identified] : Left TE in place, incisions c/d/i, no collections or s/sx of infection, HERNANDEZ drain in place to suction --> serosang fluid, not ready for removal.\par

## 2022-02-23 ENCOUNTER — APPOINTMENT (OUTPATIENT)
Dept: BREAST CENTER | Facility: CLINIC | Age: 52
End: 2022-02-23
Payer: MEDICAID

## 2022-02-23 PROCEDURE — 99024 POSTOP FOLLOW-UP VISIT: CPT

## 2022-02-25 ENCOUNTER — APPOINTMENT (OUTPATIENT)
Dept: PLASTIC SURGERY | Facility: CLINIC | Age: 52
End: 2022-02-25
Payer: MEDICAID

## 2022-02-25 VITALS — BODY MASS INDEX: 21.07 KG/M2 | OXYGEN SATURATION: 97 % | HEIGHT: 68 IN | WEIGHT: 139 LBS | HEART RATE: 71 BPM

## 2022-02-25 PROCEDURE — 99024 POSTOP FOLLOW-UP VISIT: CPT

## 2022-02-25 NOTE — DATA REVIEWED
[FreeTextEntry1] : 7/17/19: L US bx 11:00: 0.1cm IDC well differentiated ER+ (90%), HI+ (90%), HER2 1+ (-)  \par \par 8/9/19: L NLoc Lump & SNBx:1.2cm IDC well differentiated, multiple coalescing foci, LCIS, classic type/DEEP II, ADH, FEA, ER+ (90%), HI+ (90%), HER2 1+ (-); (0/3) LN (+) superior margin, 0.7cm from medial margin, 1cm from remaining margin  \par \par 8/23/19: L Re-excision Lumpx: no residual carcinoma  \par \par 6/1/20: [facility] & US: MG - L - central upper post-op changes, stable probably benign central and UOQ grouped calcs; US - L - 0.4cm cyst w/ inspissated debris 12:30 4FN, 0.4cm cyst w/ inspissated debris 1:00 7FN. BI-RADS 3 \par \par 2/22/21: L DXMG & US: heterogeneously dense, L – central outer posterior calcs spanning 2.5cm slightly increased in number and do no definitively layer. BIRADS 4 – Stereo bx rec \par \par 6/24/21: [facility]MG & US ([facility]): heterogeneously dense, L – post-op architectural distortion UOQ, two clusters of microcalcs one in UOQ and second at 3:00 which are characterized as pleomorphi and vary in shape, size, and density, one or two linear calcs may represent suspicious ductal casting vs. Milk of calcium, UOQ segment of calcs measures 5cm x 5cm, posterior cluster at 3:00 measures 2.5cm x 1.5cm x 1.2cm, these calcs have increased in size since 2019 when biopsy was initially recommended, US – L – tiny cyst UOQ. BIRADS 4.  \par \par 10/8/2021 ([facility]) Left breast UOQ posterior pathology: - Ductal carcinoma in situ (DCIS), cribriform type with high nuclear grade and marked necrosis, extending into lobules - Calcifications associated with DCIS ER 75%, HI 5-10%. Left breast UOQ anterior pathology: - Atypical ductal hyperplasia (ADH) and flat epithelial atypia (FEA) - Calcifications associated with atypical epithelium\par \par 10/21/2021 ([facility]) bilateral breast MRI showing 1. Suspicious 1 cm region of clumped nonmass enhancement at the right 10:00 axis middle to posterior third for which MR guided core biopsy is recommended.\par 2. Known recently diagnosed left breast recurrent neoplasm with DCIS at the left 4:00 axis spanning a 2.5 cm region and atypical ductal hyperplasia approaching malignancy at the left 2:00 axis along the superior anterior aspect of the prior lumpectomy site. Additional highly suspicious 1.4 cm region of clumped nonmass enhancement at the left 12:00 axis anterior third is located 2.5 cm superior to the anterior biopsy site. If breast conservation is being entertained then MR guided core biopsy is recommended. Additional indeterminate 4 mm focus of enhancement at the left 8:00 axis and faint 2 cm region of linear nonmass enhancement at the left 1-2:00 axis anterior third, for which the management will be based on the biopsy result of the dominant 12:00 axis finding. If the biopsy result is found to be malignant then an additional biopsy will likely not . If the biopsy result is found to be benign and breast conservation is still being entertained, then additional MR biopsy may be warranted. \par 1. MR biopsy of the right 10:00 axis 1 cm clumped nonmass enhancement.\par 2. If clinically warranted, MR biopsy of the left 12:00 axis highly suspicious 1.4 cm clumped nonmass enhancement.\par 3. Pending the results of the left MR biopsy will determine whether there is a need for an additional MR biopsy.\par 4. Appropriate action should be taken for the left breast cancer. The patient is under the surgical management referring clinician for her recurrent left breast cancer.\par BIRADS 4 \par \par 11/5/2021 (addendum 11/9/21 incl path results)  MR GUIDED BILATERAL BREAST BIOPSY 2 SITES ([facility]): \par The result in the right breast 10:00 position is benign breast tissue with stromal fibrosis. This is concordant with imaging findings. Follow-up bilateral breast MRI with and without intravenous contrast is recommended in 6 months.\par The result in the left breast 12:00 position is ductal carcinoma in situ (DCIS), cribriform type with high nuclear grade and necrosis, extending into lobules and rare calcifications associated with DCIS and surrounding epithelium. This is malignant and is concordant with imaging findings. Surgical consultation is recommended. ADH\par \par 12/9/2021 ([facility]) . Left breast, upper outer quadrant, excision:\par - Ductal carcinoma in situ (DCIS) solid and cribriform types with high\par nuclear grade, necrosis, extending into lobules, multiple foci.\par - Margins, as present on this specimen:Inferior: Positive Medial: Positive Anterior: Positive Superior: 1.5 mm All remaining margins: > 10 mm - Lobular carcinoma in situ (LCIS), atypical lobular hyperplasia (ALH) and flat epithelial atypia (FEA). - Biopsy site changes x 3 - Calcifications associated with DCIS and benign epithelium. 2. Left breast, new inferior medial margin: - Benign breast tissue.\par \par 2/18/2022 ([facility]) Surgical Pathology Report: SENTINEL NODE #1 LEFT AXILLA COUNT 12 negative for tumor. LEFT MASTECTOMY: Focal atypical ductal hyperplasia (ADH), Lobular carcinoma in situ (LCIS) and atypical lobular hyperplasia (ALH), Fibrocystic changes with columnar cell change, Prior biopsy site changes, Unremarkable nipple, Unremarkable skeletal muscle. \par \par

## 2022-02-25 NOTE — ASSESSMENT
[FreeTextEntry1] : 53 yo female presents for post-operative evaluation of left 12:00 DCIS and atypical ductal hyperplasia, s/p left total mastectomy, SLBN and reconstruction with Dr. Lerman on 2/18/2022, previously s/p localized lumpectomy x 3 on 12/9/2021 with three positive margins. Physical exam WNL. Patient is to return in 3 months for reexamination and sozo. Patient verbalized understanding and agreement of plan. \par

## 2022-02-25 NOTE — REASON FOR VISIT
[Post Op: _________] : a [unfilled] post op visit [FreeTextEntry1] :  left 12:00 DCIS and atypical ductal hyperplasia, s/p left total mastectomy, SLBN and reconstruction

## 2022-02-25 NOTE — HISTORY OF PRESENT ILLNESS
[FreeTextEntry1] : 53 yo female presents for post-operative evaluation of left 12:00 DCIS and atypical ductal hyperplasia, s/p left total mastectomy, SLBN and reconstruction with Dr. Lerman on 2/18/2022, previously s/p localized lumpectomy x 3 on 12/9/2021 with three positive margins. Patient with family history of probable breast cancer in mother at age 39. She is doing well, denies pain.\par \par She was previously seen by Dr. Burris last seen by Dr. Tolbert 9/30/20 s/p L lumpx & SNBX 8/9/19 for 1.2cm IDC ER/CT+, HER2(-), (0/3) LN, (+) superior margin, s/p L Re-excision - no residual carcinoma, Oncotype 9, no RT or AI; FHx probable breast ca mother 39. L DXMG 2/22/21 showed increased nonlayering L calcifications for which stereo bx was recommended and not performed f/u [facility] & US 6/24/21 showed the microcalcs in two areas were pleomorphic in distribution and had increased in size with UOQ microcalcs measuring 5cm x 5cm and posterior cluster at 3:00 measuring 2.5cm. Stereo bx was once again recommended.\par \par

## 2022-02-25 NOTE — PHYSICAL EXAM
[Examined in the supine and seated position] : examined in the supine and seated position [Asymmetrical] : asymmetrical [No dominant masses] : no dominant masses in right breast  [No Nipple Retraction] : no right nipple retraction [No Nipple Discharge] : no right nipple discharge [No Axillary Lymphadenopathy] : no right axillary lymphadenopathy [de-identified] : acquired absence of left breast

## 2022-02-27 NOTE — ASSESSMENT
[FreeTextEntry1] : HERNANDEZ drain removed \par Sports bra\par Aquaphor to prineo\par \par RTC in 1 week for Dr. Lerman

## 2022-02-27 NOTE — HISTORY OF PRESENT ILLNESS
[FreeTextEntry1] : 51 y/o female presents 7 days s/p left breast reconstruction with tissue expander. Denies any f/c/n/v. Patient is taking ibuprofen 400-800 mg every 6 hrs for the pain prn. Left HERNANDEZ drain in place--> serosang fluid, ready for removal.

## 2022-02-27 NOTE — PHYSICAL EXAM
[de-identified] : Left TE in place, incisions c/d/i, no collections or s/sx of infection, HERNANDEZ drain--> serosang fluid, removed.\par

## 2022-03-01 ENCOUNTER — APPOINTMENT (OUTPATIENT)
Dept: PLASTIC SURGERY | Facility: CLINIC | Age: 52
End: 2022-03-01
Payer: MEDICAID

## 2022-03-01 VITALS — OXYGEN SATURATION: 98 % | BODY MASS INDEX: 21.07 KG/M2 | HEIGHT: 68 IN | HEART RATE: 69 BPM | WEIGHT: 139 LBS

## 2022-03-01 PROCEDURE — 99024 POSTOP FOLLOW-UP VISIT: CPT

## 2022-03-01 NOTE — HISTORY OF PRESENT ILLNESS
[FreeTextEntry1] : 51 y/o female presents 11 days s/p left breast reconstruction with tissue expander. Denies any f/c/n/v. Patient is taking ibuprofen 400-800 mg every 6 hrs for the pain prn. She has c/o potential seroma to left axilla.

## 2022-03-01 NOTE — PHYSICAL EXAM
[de-identified] : Left TE in place, incisions healing well, no collections or s/sx of infection, Expanded her left TE today and also aspirated 80 cc's serosang periprosthetic fluid, 2 cc's serous fluid aspirated left axilla \par

## 2022-03-01 NOTE — ASSESSMENT
[FreeTextEntry1] : TE filled - 330 cc\par Sports bra\par Aquaphor to prineo\par Massage to left Axillary fullness\par Rx given for PT\par \par RTC in 1 week for Dr. Lerman

## 2022-03-05 ENCOUNTER — APPOINTMENT (OUTPATIENT)
Dept: PLASTIC SURGERY | Facility: CLINIC | Age: 52
End: 2022-03-05
Payer: MEDICAID

## 2022-03-05 PROCEDURE — 99024 POSTOP FOLLOW-UP VISIT: CPT

## 2022-03-05 NOTE — HISTORY OF PRESENT ILLNESS
[FreeTextEntry1] : 51 y/o female presents 2 weeks s/p left breast reconstruction with tissue expander. Denies any f/c/n/v. Patient is taking ibuprofen 400-800 mg every 6 hrs for the pain prn. She returns with c/o potential seroma to left axilla.

## 2022-03-05 NOTE — PHYSICAL EXAM
[de-identified] : Left TE in place, incisions healing well, no collections or s/sx of infection\par

## 2022-03-05 NOTE — ASSESSMENT
[FreeTextEntry1] : Sports bra\par Aquaphor to prineo\par Massage to left axillary fullness\par Start PT\par F/U w/ Dr. Hurtado\par  \par RTC in 2 weeks for Dr. Lerman

## 2022-03-08 ENCOUNTER — APPOINTMENT (OUTPATIENT)
Dept: PLASTIC SURGERY | Facility: CLINIC | Age: 52
End: 2022-03-08
Payer: MEDICAID

## 2022-03-09 ENCOUNTER — APPOINTMENT (OUTPATIENT)
Dept: BREAST CENTER | Facility: CLINIC | Age: 52
End: 2022-03-09
Payer: MEDICAID

## 2022-03-09 ENCOUNTER — APPOINTMENT (OUTPATIENT)
Dept: HEMATOLOGY ONCOLOGY | Facility: CLINIC | Age: 52
End: 2022-03-09

## 2022-03-09 VITALS — HEIGHT: 68 IN | OXYGEN SATURATION: 98 % | WEIGHT: 142 LBS | HEART RATE: 85 BPM | BODY MASS INDEX: 21.52 KG/M2

## 2022-03-09 DIAGNOSIS — N63.0 UNSPECIFIED LUMP IN UNSPECIFIED BREAST: ICD-10-CM

## 2022-03-09 PROCEDURE — 99024 POSTOP FOLLOW-UP VISIT: CPT

## 2022-03-09 RX ORDER — MUPIROCIN 20 MG/G
2 OINTMENT TOPICAL
Qty: 1 | Refills: 0 | Status: DISCONTINUED | COMMUNITY
Start: 2022-02-09 | End: 2022-03-09

## 2022-03-09 NOTE — PROCEDURE
[FreeTextEntry1] : Left Breast and Axillary Targeted US [FreeTextEntry2] : Left Breast Swelling  [FreeTextEntry3] : Technique: a targeted L breast ultrasound was performed with evaluation of the upper outer quadrant, inframammary fold and axilla.\par US Findings: Approximately 3 cc fluid collection in left axilla. No seromas appreciated on targeted US. \par

## 2022-03-09 NOTE — DATA REVIEWED
[FreeTextEntry1] : 6/24/21: Javier DXMG & US ([facility]): heterogeneously dense, L – post-op architectural distortion UOQ, two clusters of microcalcs one in UOQ and second at 3:00 which are characterized as pleomorphi and vary in shape, size, and density, one or two linear calcs may represent suspicious ductal casting vs. Milk of calcium, UOQ segment of calcs measures 5cm x 5cm, posterior cluster at 3:00 measures 2.5cm x 1.5cm x 1.2cm, these calcs have increased in size since 2019 when biopsy was initially recommended, US – L – tiny cyst UOQ. BIRADS 4. \par \par 10/8/2021 ([facility]) Left breast UOQ posterior pathology: - Ductal carcinoma in situ (DCIS), cribriform type with high nuclear grade and marked necrosis, extending into lobules - Calcifications associated with DCIS ER 75%, [place] 5-10%. Left breast UOQ anterior pathology: - Atypical ductal hyperplasia (ADH) and flat epithelial atypia (FEA) - Calcifications associated with atypical epithelium\par \par 10/21/2021 ([facility]) bilateral breast MRI showing 1. Suspicious 1 cm region of clumped nonmass enhancement at the right 10:00 axis middle to posterior third for which MR guided core biopsy is recommended.\par 2. Known recently diagnosed left breast recurrent neoplasm with DCIS at the left 4:00 axis spanning a 2.5 cm region and atypical ductal hyperplasia approaching malignancy at the left 2:00 axis along the superior anterior aspect of the prior lumpectomy site. Additional highly suspicious 1.4 cm region of clumped nonmass enhancement at the left 12:00 axis anterior third is located 2.5 cm superior to the anterior biopsy site. If breast conservation is being entertained then MR guided core biopsy is recommended. Additional indeterminate 4 mm focus of enhancement at the left 8:00 axis and faint 2 cm region of linear nonmass enhancement at the left 1-2:00 axis anterior third, for which the management will be based on the biopsy result of the dominant 12:00 axis finding. If the biopsy result is found to be malignant then an additional biopsy will likely not . If the biopsy result is found to be benign and breast conservation is still being entertained, then additional MR biopsy may be warranted. \par 1. MR biopsy of the right 10:00 axis 1 cm clumped nonmass enhancement.\par 2. If clinically warranted, MR biopsy of the left 12:00 axis highly suspicious 1.4 cm clumped nonmass enhancement.\par 3. Pending the results of the left MR biopsy will determine whether there is a need for an additional MR biopsy.\par 4. Appropriate action should be taken for the left breast cancer. The patient is under the surgical management referring clinician for her recurrent left breast cancer.\par BIRADS 4 \par \par 11/5/2021 (addendum 11/9/21 incl path results) MR GUIDED BILATERAL BREAST BIOPSY 2 SITES (R): \par The result in the right breast 10:00 position is benign breast tissue with stromal fibrosis. This is concordant with imaging findings. Follow-up bilateral breast MRI with and without intravenous contrast is recommended in 6 months.\par The result in the left breast 12:00 position is ductal carcinoma in situ (DCIS), cribriform type with high nuclear grade and necrosis, extending into lobules and rare calcifications associated with DCIS and surrounding epithelium. This is malignant and is concordant with imaging findings. Surgical consultation is recommended. ADH\par \par 12/9/2021 ([facility]). Left breast, upper outer quadrant, excision:\par - Ductal carcinoma in situ (DCIS) solid and cribriform types with high\par nuclear grade, necrosis, extending into lobules, multiple foci.\par - Margins, as present on this specimen:Inferior: Positive Medial: Positive Anterior: Positive Superior: 1.5 mm All remaining margins: > 10 mm - Lobular carcinoma in situ (LCIS), atypical lobular hyperplasia (ALH) and flat epithelial atypia (FEA). - Biopsy site changes x 3 - Calcifications associated with DCIS and benign epithelium. 2. Left breast, new inferior medial margin: - Benign breast tissue.\par \par 2/18/2022 ([facility]) Surgical Pathology Report: SENTINEL NODE #1 LEFT AXILLA COUNT 12 negative for tumor. LEFT MASTECTOMY: Focal atypical ductal hyperplasia (ADH), Lobular carcinoma in situ (LCIS) and atypical lobular hyperplasia (ALH), Fibrocystic changes with columnar cell change, Prior biopsy site changes, Unremarkable nipple, Unremarkable skeletal muscle. \par

## 2022-03-09 NOTE — PHYSICAL EXAM
[No Supraclavicular Adenopathy] : no supraclavicular adenopathy [Asymmetrical] : asymmetrical [No dominant masses] : no dominant masses in right breast  [No Nipple Retraction] : no right nipple retraction [No Nipple Discharge] : no right nipple discharge [No Axillary Lymphadenopathy] : no right axillary lymphadenopathy [de-identified] : Acquired absence of left breast, implant present, incisions healing well, no collections or signs of infection [de-identified] : swelling of the left axilla

## 2022-03-09 NOTE — HISTORY OF PRESENT ILLNESS
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 Lerman on 2/18/2022, previously s/p localized lumpectomy x 3 on 12/9/2021 with three positive margins. Patient saw Dr. Lerman yesterday to fill tissue expander (330cc). Patient states she is uncomfortable sleeping with the swelling and discomfort. Denies fever, chills, skin changes, discharge at incision site.

## 2022-03-09 NOTE — ASSESSMENT
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 Lerman on 2/18/2022. Patient is healing well from surgery on physical exam, no seroma appreciated on targeted US performed in office today. Provided patient reassurance and return protocol. Will refer patient to [facility] physical therapy to improve range of motion. Patient will return in May 2022, already has appointment. Patient verbalized understanding and agreement of plan.\par

## 2022-03-15 ENCOUNTER — APPOINTMENT (OUTPATIENT)
Dept: PLASTIC SURGERY | Facility: CLINIC | Age: 52
End: 2022-03-15
Payer: MEDICAID

## 2022-03-15 PROCEDURE — 99024 POSTOP FOLLOW-UP VISIT: CPT

## 2022-03-15 NOTE — ASSESSMENT
[FreeTextEntry1] : TE filled - 430 cc\par Aquaphor \par Massage to left Axillary fullness\par Continue PT\par \par RTC in 3 weeks for NP - TE fill likely ~70 cc

## 2022-03-15 NOTE — HISTORY OF PRESENT ILLNESS
[FreeTextEntry1] : 51 y/o female presents 24 days s/p left breast reconstruction with tissue expander. Denies any f/c/n/v. Patient is taking ibuprofen 400-800 mg every 6 hrs for the pain prn. She saw Dr. Hurtado last week for concerns of seroma at left axilla - no aspiration required.

## 2022-03-15 NOTE — PHYSICAL EXAM
[de-identified] : Left TE in place, incisions healing well, no collections or s/sx of infection, Expanded her left TE today\par

## 2022-04-01 ENCOUNTER — NON-APPOINTMENT (OUTPATIENT)
Age: 52
End: 2022-04-01

## 2022-04-07 ENCOUNTER — APPOINTMENT (OUTPATIENT)
Dept: PLASTIC SURGERY | Facility: CLINIC | Age: 52
End: 2022-04-07
Payer: MEDICAID

## 2022-04-07 VITALS — OXYGEN SATURATION: 97 % | BODY MASS INDEX: 21.52 KG/M2 | HEART RATE: 84 BPM | HEIGHT: 68 IN | WEIGHT: 142 LBS

## 2022-04-07 PROCEDURE — 99024 POSTOP FOLLOW-UP VISIT: CPT

## 2022-04-07 NOTE — PROCEDURE
[FreeTextEntry6] : After localizing the port of the tissue expander with the magnet, I prepped the area with chloroprep and then using a 21 gauge butterfly needle, I percutaneously accessed the port of the expander and injected 70 cc of injectable saline into the expander. \par \par Totals:\par Left: 500\par \par These are 500 cc expanders.\par \par

## 2022-04-07 NOTE — PHYSICAL EXAM
[de-identified] : Left TE in place, incisions healing well, no collections or s/sx of infection, Expanded her left TE today\par

## 2022-04-07 NOTE — HISTORY OF PRESENT ILLNESS
[FreeTextEntry1] : 51 y/o female s/p left breast reconstruction with tissue expander on 02/18/22. Denies any f/c/n/v. Patient is taking ibuprofen 400-800 mg for pain prn. She presents today for TE fill.

## 2022-04-07 NOTE — ASSESSMENT
[FreeTextEntry1] : TE filled - 500 cc\par Aquaphor \par Continue PT\par \par RTC in 5 weeks for Dr. Lerman

## 2022-04-11 ENCOUNTER — APPOINTMENT (OUTPATIENT)
Dept: PLASTIC SURGERY | Facility: CLINIC | Age: 52
End: 2022-04-11
Payer: MEDICAID

## 2022-04-11 PROCEDURE — 99024 POSTOP FOLLOW-UP VISIT: CPT

## 2022-04-12 NOTE — HISTORY OF PRESENT ILLNESS
[FreeTextEntry1] : 53 y/o female s/p left breast reconstruction with tissue expander on 02/18/22. Denies any f/c/n/v. She is taking ibuprofen 400-800 mg for pain prn. She has c/o limited range of motion LUE and pain in her left breast that began after her last TE fill.

## 2022-04-12 NOTE — PROCEDURE
[FreeTextEntry6] : After localizing the port of the tissue expander with the magnet, I prepped the area with chloroprep and then using a 21 gauge butterfly needle, I percutaneously accessed the port of the expander and removed 50 cc of injectable saline from the expander. \par \par Totals:\par Left: 450\par \par These are 500 cc expanders.\par \par

## 2022-04-12 NOTE — ASSESSMENT
[FreeTextEntry1] : 50 cc's removed from left TE - now 450 cc\par Aquaphor \par Continue PT\par \par RTC in 1 month for Dr. Lerman

## 2022-04-12 NOTE — PHYSICAL EXAM
[de-identified] : Left TE in place, incisions healing well, no collections or s/sx of infection, removed 50 cc's from her left TE today\par

## 2022-04-13 ENCOUNTER — APPOINTMENT (OUTPATIENT)
Dept: HEMATOLOGY ONCOLOGY | Facility: CLINIC | Age: 52
End: 2022-04-13

## 2022-04-19 ENCOUNTER — NON-APPOINTMENT (OUTPATIENT)
Age: 52
End: 2022-04-19

## 2022-05-03 ENCOUNTER — APPOINTMENT (OUTPATIENT)
Dept: PLASTIC SURGERY | Facility: CLINIC | Age: 52
End: 2022-05-03
Payer: MEDICAID

## 2022-05-03 PROCEDURE — 99024 POSTOP FOLLOW-UP VISIT: CPT

## 2022-05-03 RX ORDER — IBUPROFEN 600 MG/1
600 TABLET, FILM COATED ORAL 4 TIMES DAILY
Qty: 25 | Refills: 0 | Status: DISCONTINUED | COMMUNITY
Start: 2022-03-01 | End: 2022-05-03

## 2022-05-03 NOTE — PHYSICAL EXAM
[de-identified] : Left TE in place, incisions healing well, no collections or s/sx of infection, added 30 cc's to her left TE today\par

## 2022-05-03 NOTE — ASSESSMENT
[FreeTextEntry1] : 30 cc's added to left TE, she could not tolerate more - now 480 cc\par Aquaphor \par Continue PT\par \par RTC in 2 weeks for Dr. Lerman

## 2022-05-03 NOTE — HISTORY OF PRESENT ILLNESS
[FreeTextEntry1] : 53 y/o female s/p left breast reconstruction with tissue expander on 02/18/22. Denies any f/c/n/v. Patient is here for T/E fill.

## 2022-05-03 NOTE — PROCEDURE
[FreeTextEntry6] : After localizing the port of the tissue expander with the magnet, I prepped the area with chloroprep and then using a 21 gauge butterfly needle, I percutaneously accessed the port of the expander and added 30 cc of injectable saline from the expander. \par \par Totals:\par Left: 480\par \par These are 500 cc expanders.\par \par

## 2022-05-17 ENCOUNTER — APPOINTMENT (OUTPATIENT)
Dept: PLASTIC SURGERY | Facility: CLINIC | Age: 52
End: 2022-05-17
Payer: MEDICAID

## 2022-05-17 PROCEDURE — 99024 POSTOP FOLLOW-UP VISIT: CPT

## 2022-05-17 NOTE — PHYSICAL EXAM
[de-identified] : Left TE in place, incisions healing well, right breast ptosis and lack of upper pole fullness. no collections or s/sx of infection, left nipple surgically absent

## 2022-05-17 NOTE — HISTORY OF PRESENT ILLNESS
[FreeTextEntry1] : 51 y/o female s/p left breast reconstruction with tissue expander on 02/18/22. Denies any f/c/n/v. Patient is here to discuss second stage of surgery.

## 2022-05-17 NOTE — ASSESSMENT
[FreeTextEntry1] : left TE: 480 cc\par Allergan 500 TE\par Aquaphor to incisions\par \par Second stage revision: \par right mastopexy with autoaugmentation, left TE exchange for silicone implant Slightly smaller\par Fat grafting to stepoff deformity at time of nipple reconstruction as third stage

## 2022-05-25 ENCOUNTER — NON-APPOINTMENT (OUTPATIENT)
Age: 52
End: 2022-05-25

## 2022-05-25 ENCOUNTER — APPOINTMENT (OUTPATIENT)
Dept: BREAST CENTER | Facility: CLINIC | Age: 52
End: 2022-05-25
Payer: MEDICAID

## 2022-05-25 VITALS — BODY MASS INDEX: 21.52 KG/M2 | OXYGEN SATURATION: 98 % | HEIGHT: 68 IN | WEIGHT: 142 LBS | HEART RATE: 84 BPM

## 2022-05-25 DIAGNOSIS — R92.2 INCONCLUSIVE MAMMOGRAM: ICD-10-CM

## 2022-05-25 PROCEDURE — 99213 OFFICE O/P EST LOW 20 MIN: CPT

## 2022-05-25 PROCEDURE — 93702 BIS XTRACELL FLUID ANALYSIS: CPT | Mod: NC

## 2022-05-25 PROCEDURE — 76642 ULTRASOUND BREAST LIMITED: CPT

## 2022-05-30 NOTE — PHYSICAL EXAM
[No Supraclavicular Adenopathy] : no supraclavicular adenopathy [Asymmetrical] : asymmetrical [No dominant masses] : no dominant masses in right breast  [No Nipple Retraction] : no right nipple retraction [No Nipple Discharge] : no right nipple discharge [No Axillary Lymphadenopathy] : no right axillary lymphadenopathy [de-identified] : Acquired absence of left breast, implant present, incisions well healed [de-identified] : swelling of the left axilla

## 2022-05-30 NOTE — DATA REVIEWED
[FreeTextEntry1] : 6/24/21: Javier DXMG & US ([facility]): heterogeneously dense, L – post-op architectural distortion UOQ, two clusters of microcalcs one in UOQ and second at 3:00 which are characterized as pleomorphi and vary in shape, size, and density, one or two linear calcs may represent suspicious ductal casting vs. Milk of calcium, UOQ segment of calcs measures 5cm x 5cm, posterior cluster at 3:00 measures 2.5cm x 1.5cm x 1.2cm, these calcs have increased in size since 2019 when biopsy was initially recommended, US – L – tiny cyst UOQ. BIRADS 4. \par \par 10/8/2021 ([facility]) Left breast UOQ posterior pathology: - Ductal carcinoma in situ (DCIS), cribriform type with high nuclear grade and marked necrosis, extending into lobules - Calcifications associated with DCIS ER 75%, [place] 5-10%. Left breast UOQ anterior pathology: - Atypical ductal hyperplasia (ADH) and flat epithelial atypia (FEA) - Calcifications associated with atypical epithelium\par \par 10/21/2021 ([facility]) bilateral breast MRI showing 1. Suspicious 1 cm region of clumped nonmass enhancement at the right 10:00 axis middle to posterior third for which MR guided core biopsy is recommended.\par 2. Known recently diagnosed left breast recurrent neoplasm with DCIS at the left 4:00 axis spanning a 2.5 cm region and atypical ductal hyperplasia approaching malignancy at the left 2:00 axis along the superior anterior aspect of the prior lumpectomy site. Additional highly suspicious 1.4 cm region of clumped nonmass enhancement at the left 12:00 axis anterior third is located 2.5 cm superior to the anterior biopsy site. If breast conservation is being entertained then MR guided core biopsy is recommended. Additional indeterminate 4 mm focus of enhancement at the left 8:00 axis and faint 2 cm region of linear nonmass enhancement at the left 1-2:00 axis anterior third, for which the management will be based on the biopsy result of the dominant 12:00 axis finding. If the biopsy result is found to be malignant then an additional biopsy will likely not . If the biopsy result is found to be benign and breast conservation is still being entertained, then additional MR biopsy may be warranted. \par 1. MR biopsy of the right 10:00 axis 1 cm clumped nonmass enhancement.\par 2. If clinically warranted, MR biopsy of the left 12:00 axis highly suspicious 1.4 cm clumped nonmass enhancement.\par 3. Pending the results of the left MR biopsy will determine whether there is a need for an additional MR biopsy.\par 4. Appropriate action should be taken for the left breast cancer. The patient is under the surgical management referring clinician for her recurrent left breast cancer.\par BIRADS 4 \par \par 11/5/2021 (addendum 11/9/21 incl path results) MR GUIDED BILATERAL BREAST BIOPSY 2 SITES (R): \par The result in the right breast 10:00 position is benign breast tissue with stromal fibrosis. This is concordant with imaging findings. Follow-up bilateral breast MRI with and without intravenous contrast is recommended in 6 months.\par The result in the left breast 12:00 position is ductal carcinoma in situ (DCIS), cribriform type with high nuclear grade and necrosis, extending into lobules and rare calcifications associated with DCIS and surrounding epithelium. This is malignant and is concordant with imaging findings. Surgical consultation is recommended. ADH\par \par 12/9/2021 ([facility]). Left breast, upper outer quadrant, excision:\par - Ductal carcinoma in situ (DCIS) solid and cribriform types with high\par nuclear grade, necrosis, extending into lobules, multiple foci.\par - Margins, as present on this specimen:Inferior: Positive Medial: Positive Anterior: Positive Superior: 1.5 mm All remaining margins: > 10 mm - Lobular carcinoma in situ (LCIS), atypical lobular hyperplasia (ALH) and flat epithelial atypia (FEA). - Biopsy site changes x 3 - Calcifications associated with DCIS and benign epithelium. 2. Left breast, new inferior medial margin: - Benign breast tissue.\par \par 2/18/2022 ([facility]) Surgical Pathology Report: SENTINEL NODE #1 LEFT AXILLA COUNT 12 negative for tumor. LEFT MASTECTOMY: Focal atypical ductal hyperplasia (ADH), Lobular carcinoma in situ (LCIS) and atypical lobular hyperplasia (ALH), Fibrocystic changes with columnar cell change, Prior biopsy site changes, Unremarkable nipple, Unremarkable skeletal muscle. \par

## 2022-05-30 NOTE — ASSESSMENT
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 Lerman on 2/18/2022, previously s/p localized lumpectomy x 3 on 12/9/2021 with three positive margins. Patient is pending second stage of reconstruction surgery - right mastopexy with autoaugmentation and left TE exchange for silicone implant. Patient states she is feeling well today, axillary swelling and limited ROM has resolved. Patient attended one physical therapy session and felt she no longer needed therapy. Patient is well healed on physical exam and has no focal complaints. Discussed the need for patient to see both medical oncology to discuss further treatment. Patient will schedule appointment and have R DXMMG/US within the next few weeks. Patient will then follow up with us in August 2022. Patient verbalized understanding and agreement of plan.\par

## 2022-05-30 NOTE — HISTORY OF PRESENT ILLNESS
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 Lerman on 2/18/2022, previously s/p localized lumpectomy x 3 on 12/9/2021 with three positive margins. Patient is pending second stage of reconstruction surgery - right mastopexy with autoaugmentation and left TE exchange for silicone implant. Patient states she is feeling well today, axillary swelling and limited ROM has resolved. Patient attended one physical therapy session and felt she no longer needed therapy. \par \par Denies any palpable abnormalities, skin changes, nipple changes or nipple discharge bilaterally.\par \par

## 2022-06-06 ENCOUNTER — NON-APPOINTMENT (OUTPATIENT)
Age: 52
End: 2022-06-06

## 2022-06-10 ENCOUNTER — NON-APPOINTMENT (OUTPATIENT)
Age: 52
End: 2022-06-10

## 2022-07-06 ENCOUNTER — APPOINTMENT (OUTPATIENT)
Dept: HEMATOLOGY ONCOLOGY | Facility: CLINIC | Age: 52
End: 2022-07-06

## 2022-07-27 ENCOUNTER — APPOINTMENT (OUTPATIENT)
Dept: PLASTIC SURGERY | Facility: CLINIC | Age: 52
End: 2022-07-27

## 2022-07-27 PROCEDURE — 99442: CPT

## 2022-07-28 NOTE — HISTORY OF PRESENT ILLNESS
[Home] : at home, [unfilled] , at the time of the visit. [Other Location: e.g. Home (Enter Location, City,State)___] : at [unfilled] [Verbal consent obtained from patient] : the patient, [unfilled] [FreeTextEntry1] : Patient Name: HAILEE CARDONA \par : 1970 \par Date: 2022 \par Attending: Dr. Oren Lerman\par \par HPI: preop consultation for  left breast TE to implant exchange and right mastopexy for symmetry on 22.\par \par Allergies: NKDA\par Medication prescribed: ibuprofen 600 mg, oxycodone 5 mg\par \par ROS: complete 14 point review of systems negative except pertinent items reviewed in the HPI. Other non-contributory items reviewed in our new patient questionnaire and we have submitted it to be scanned into the medical record. \par \par Physical Exam: \par completed at time of in office evaluation \par \par Assessment/Plan:\par We have discussed pre and postop instructions, recovery limitations, restrictions and expectations, ERAS protocol, NPO status, transportation home, postop medications, COVID-19 policies, benefits and risks of the procedure. Pre-op labs reviewed. The patient would like to proceed with surgery as scheduled.\par \par HIMANSHU TAYLOR NP\par \par

## 2022-08-02 ENCOUNTER — LABORATORY RESULT (OUTPATIENT)
Age: 52
End: 2022-08-02

## 2022-08-03 ENCOUNTER — APPOINTMENT (OUTPATIENT)
Dept: HEMATOLOGY ONCOLOGY | Facility: CLINIC | Age: 52
End: 2022-08-03

## 2022-08-04 NOTE — ASU PATIENT PROFILE, ADULT - FALL HARM RISK - UNIVERSAL INTERVENTIONS
Bed in lowest position, wheels locked, appropriate side rails in place/Call bell, personal items and telephone in reach/Instruct patient to call for assistance before getting out of bed or chair/Non-slip footwear when patient is out of bed/[place] to call system/Physically safe environment - no spills, clutter or unnecessary equipment/Purposeful Proactive Rounding/Room/bathroom lighting operational, light cord in reach

## 2022-08-04 NOTE — ASU PATIENT PROFILE, ADULT - VISION (WITH CORRECTIVE LENSES IF THE PATIENT USUALLY WEARS THEM):
Pt states will remove contacts prior to going to OR/Partially impaired: cannot see medication labels or newsprint, but can see obstacles in path, and the surrounding layout; can count fingers at arm's length

## 2022-08-04 NOTE — ASU PATIENT PROFILE, ADULT - NSICDXPASTSURGICALHX_GEN_ALL_CORE_FT
PAST SURGICAL HISTORY:  S/P breast reconstruction, left x2 2021 2022    S/P lumpectomy, left breast 2019

## 2022-08-05 ENCOUNTER — OUTPATIENT (OUTPATIENT)
Dept: OUTPATIENT SERVICES | Facility: HOSPITAL | Age: 52
LOS: 1 days | Discharge: HOME | End: 2022-08-05

## 2022-08-05 ENCOUNTER — TRANSCRIPTION ENCOUNTER (OUTPATIENT)
Age: 52
End: 2022-08-05

## 2022-08-05 ENCOUNTER — RESULT REVIEW (OUTPATIENT)
Age: 52
End: 2022-08-05

## 2022-08-05 VITALS
DIASTOLIC BLOOD PRESSURE: 80 MMHG | RESPIRATION RATE: 15 BRPM | HEART RATE: 74 BPM | OXYGEN SATURATION: 97 % | SYSTOLIC BLOOD PRESSURE: 136 MMHG

## 2022-08-05 VITALS
HEART RATE: 73 BPM | RESPIRATION RATE: 15 BRPM | HEIGHT: 68 IN | DIASTOLIC BLOOD PRESSURE: 80 MMHG | SYSTOLIC BLOOD PRESSURE: 132 MMHG | OXYGEN SATURATION: 100 % | TEMPERATURE: 98 F | WEIGHT: 142.86 LBS

## 2022-08-05 DIAGNOSIS — Z90.12 ACQUIRED ABSENCE OF LEFT BREAST AND NIPPLE: ICD-10-CM

## 2022-08-05 DIAGNOSIS — Z98.890 OTHER SPECIFIED POSTPROCEDURAL STATES: Chronic | ICD-10-CM

## 2022-08-05 PROCEDURE — 19316 MASTOPEXY: CPT | Mod: RT

## 2022-08-05 PROCEDURE — 88300 SURGICAL PATH GROSS: CPT | Mod: 26,59

## 2022-08-05 PROCEDURE — 88305 TISSUE EXAM BY PATHOLOGIST: CPT | Mod: 26

## 2022-08-05 PROCEDURE — 19342 INSJ/RPLCMT BRST IMPLT SEP D: CPT | Mod: LT

## 2022-08-05 DEVICE — IMP BREAST SMOOTH COHESIVE ROUND 450CC: Type: IMPLANTABLE DEVICE | Site: LEFT | Status: FUNCTIONAL

## 2022-08-05 RX ORDER — ACETAMINOPHEN 500 MG
1000 TABLET ORAL ONCE
Refills: 0 | Status: COMPLETED | OUTPATIENT
Start: 2022-08-05 | End: 2022-08-05

## 2022-08-05 RX ORDER — SODIUM CHLORIDE 9 MG/ML
1000 INJECTION, SOLUTION INTRAVENOUS
Refills: 0 | Status: DISCONTINUED | OUTPATIENT
Start: 2022-08-05 | End: 2022-08-05

## 2022-08-05 RX ORDER — ONDANSETRON 8 MG/1
4 TABLET, FILM COATED ORAL ONCE
Refills: 0 | Status: DISCONTINUED | OUTPATIENT
Start: 2022-08-05 | End: 2022-08-05

## 2022-08-05 RX ORDER — FENTANYL CITRATE 50 UG/ML
50 INJECTION INTRAVENOUS
Refills: 0 | Status: DISCONTINUED | OUTPATIENT
Start: 2022-08-05 | End: 2022-08-05

## 2022-08-05 RX ORDER — HYDROMORPHONE HYDROCHLORIDE 2 MG/ML
0.5 INJECTION INTRAMUSCULAR; INTRAVENOUS; SUBCUTANEOUS
Refills: 0 | Status: DISCONTINUED | OUTPATIENT
Start: 2022-08-05 | End: 2022-08-05

## 2022-08-05 RX ORDER — APREPITANT 80 MG/1
40 CAPSULE ORAL ONCE
Refills: 0 | Status: COMPLETED | OUTPATIENT
Start: 2022-08-05 | End: 2022-08-05

## 2022-08-05 RX ORDER — ACETAMINOPHEN 500 MG
650 TABLET ORAL ONCE
Refills: 0 | Status: DISCONTINUED | OUTPATIENT
Start: 2022-08-05 | End: 2022-08-05

## 2022-08-05 RX ORDER — OXYCODONE HYDROCHLORIDE 5 MG/1
5 TABLET ORAL ONCE
Refills: 0 | Status: DISCONTINUED | OUTPATIENT
Start: 2022-08-05 | End: 2022-08-05

## 2022-08-05 RX ADMIN — Medication 1000 MILLIGRAM(S): at 06:29

## 2022-08-05 RX ADMIN — APREPITANT 40 MILLIGRAM(S): 80 CAPSULE ORAL at 06:29

## 2022-08-05 NOTE — ASU DISCHARGE PLAN (ADULT/PEDIATRIC) - CARE PROVIDER_API CALL
Lerman, Oren Z (MD)  Plastic Surgery  [facility] [address] [place] [id number]  Phone: [phone]  Fax: [phone]  Follow Up Time: 1-3 days

## 2022-08-05 NOTE — ASU DISCHARGE PLAN (ADULT/PEDIATRIC) - NS MD DC FALL RISK RISK
For information on Fall & Injury Prevention, visit: [email or web address] OR  [email or web address] OR  https://www.cdc.gov/steadi/patient.html

## 2022-08-05 NOTE — ASU PREOP CHECKLIST - 1.
Pt informed Rn that she has Disposable Contacts on state she will remove them after signing consents  and prior to going to Sx

## 2022-08-05 NOTE — BRIEF OPERATIVE NOTE - NSICDXBRIEFPROCEDURE_GEN_ALL_CORE_FT
PROCEDURES:  Augmentation of right breast with mastopexy 05-Aug-2022 10:37:33  Susana Feliz  Replacement of breast tissue expander 05-Aug-2022 10:38:39  Susana Feliz

## 2022-08-05 NOTE — PRE-ANESTHESIA EVALUATION ADULT - NSANTHOSAYNRD_GEN_A_CORE
No. FAVIOLA screening performed.  STOP BANG Legend: 0-2 = LOW Risk; 3-4 = INTERMEDIATE Risk; 5-8 = HIGH Risk

## 2022-08-09 ENCOUNTER — APPOINTMENT (OUTPATIENT)
Dept: PLASTIC SURGERY | Facility: CLINIC | Age: 52
End: 2022-08-09

## 2022-08-09 VITALS — BODY MASS INDEX: 21.37 KG/M2 | HEART RATE: 81 BPM | HEIGHT: 68 IN | OXYGEN SATURATION: 99 % | WEIGHT: 141 LBS

## 2022-08-09 PROCEDURE — 99024 POSTOP FOLLOW-UP VISIT: CPT

## 2022-08-09 RX ORDER — OXYCODONE 5 MG/1
5 TABLET ORAL
Qty: 12 | Refills: 0 | Status: DISCONTINUED | COMMUNITY
Start: 2022-07-27 | End: 2022-08-09

## 2022-08-09 NOTE — PHYSICAL EXAM
[de-identified] : Left implant in place. Incisions c/d/i, no collections or s/sx of infection, left NAC surgically absent, right nipple warm, viable, +PO ecchymosis\par

## 2022-08-09 NOTE — HISTORY OF PRESENT ILLNESS
[FreeTextEntry1] : 53 y/o female presents 4 days s/p left breast TE to implant exchange and right mastopexy on 08/05/22. Denies any f/c/n/v. She is taking ibuprofen for pain. very happy with the results

## 2022-08-09 NOTE — SURGICAL HISTORY
[de-identified] : 02/18/22; left breast reconstruction with tissue expander  [de-identified] : 08/05/22; left breast TE to implant exchange and right mastopexy; Left implant 450 cc [facility]

## 2022-08-09 NOTE — ASSESSMENT
[FreeTextEntry1] : Healing well\par \par Allow steris to fall off\par Aquaphor to incisions\par Shower ok\par No strenuous activity x 2 weeks\par Sports bra\par \par RTC in 1 month

## 2022-08-10 PROBLEM — C50.919 MALIGNANT NEOPLASM OF UNSPECIFIED SITE OF UNSPECIFIED FEMALE BREAST: Chronic | Status: ACTIVE | Noted: 2022-08-04

## 2022-08-10 PROBLEM — K21.9 GASTRO-ESOPHAGEAL REFLUX DISEASE WITHOUT ESOPHAGITIS: Chronic | Status: ACTIVE | Noted: 2022-08-04

## 2022-08-17 LAB — SURGICAL PATHOLOGY STUDY: SIGNIFICANT CHANGE UP

## 2022-08-22 ENCOUNTER — APPOINTMENT (OUTPATIENT)
Dept: PLASTIC SURGERY | Facility: CLINIC | Age: 52
End: 2022-08-22

## 2022-08-22 ENCOUNTER — NON-APPOINTMENT (OUTPATIENT)
Age: 52
End: 2022-08-22

## 2022-08-22 VITALS — WEIGHT: 141 LBS | OXYGEN SATURATION: 97 % | HEART RATE: 71 BPM | BODY MASS INDEX: 21.37 KG/M2 | HEIGHT: 68 IN

## 2022-08-22 PROCEDURE — 99024 POSTOP FOLLOW-UP VISIT: CPT

## 2022-08-24 NOTE — HISTORY OF PRESENT ILLNESS
[FreeTextEntry1] : 53 y/o female presents 17 days s/p left breast TE to implant exchange and right mastopexy on 08/05/22. Denies any f/c/n/v. She is taking ibuprofen for pain prn. Patient has c/o intermittent yellowish discharge under her right breast since Thursday.

## 2022-08-24 NOTE — PHYSICAL EXAM
[de-identified] : Left implant in place, no collections or s/sx of infection, left NAC surgically absent, right nipple warm, viable, 1 cm x 0.5 cm delayed wound healing right breast t-point\par

## 2022-08-24 NOTE — SURGICAL HISTORY
[de-identified] : 02/18/22; left breast reconstruction with tissue expander  [de-identified] : 08/05/22; left breast TE to implant exchange and right mastopexy; Left implant 450 cc [facility]

## 2022-08-24 NOTE — ASSESSMENT
[FreeTextEntry1] : Aquacel to right breast t-point incision\par Aquaphor to incisions\par Shower ok\par Sports bra\par \par RTC in 1 month

## 2022-09-06 ENCOUNTER — APPOINTMENT (OUTPATIENT)
Dept: PLASTIC SURGERY | Facility: CLINIC | Age: 52
End: 2022-09-06

## 2022-09-15 ENCOUNTER — APPOINTMENT (OUTPATIENT)
Dept: PLASTIC SURGERY | Facility: CLINIC | Age: 52
End: 2022-09-15

## 2022-09-15 PROCEDURE — 99024 POSTOP FOLLOW-UP VISIT: CPT

## 2022-09-15 RX ORDER — IBUPROFEN 600 MG/1
600 TABLET, FILM COATED ORAL EVERY 6 HOURS
Qty: 24 | Refills: 0 | Status: DISCONTINUED | COMMUNITY
Start: 2022-07-27 | End: 2022-09-15

## 2022-09-18 NOTE — SURGICAL HISTORY
[de-identified] : 02/18/22; left breast reconstruction with tissue expander  [de-identified] : 08/05/22; left breast TE to implant exchange and right mastopexy; Left implant 450 cc [facility] [facility]

## 2022-09-18 NOTE — PHYSICAL EXAM
[de-identified] : Left implant in place, no collections or s/sx of infection, left NAC surgically absent, right nipple warm, viable, 5 mm x 5 mm breakdown at t-point, healthy granulation tissue formation, spitting suture removed\par

## 2022-09-18 NOTE — ASSESSMENT
[FreeTextEntry1] : small area delayed healing right breast t-point will heal secondarily with local wound care - aquaphor  / DSD \par Moisturizer to incisions\par No underwire \par \par RTC in 3 weeks for NP\par RTC in December to discuss left nipple reconstruction

## 2022-09-18 NOTE — HISTORY OF PRESENT ILLNESS
[FreeTextEntry1] : 51 y/o female s/p left breast TE to implant exchange and right mastopexy on 08/05/22. Denies any f/c/n/v. Patient is not taking any pain medication.

## 2022-09-26 ENCOUNTER — NON-APPOINTMENT (OUTPATIENT)
Age: 52
End: 2022-09-26

## 2022-09-26 ENCOUNTER — APPOINTMENT (OUTPATIENT)
Dept: HEMATOLOGY ONCOLOGY | Facility: CLINIC | Age: 52
End: 2022-09-26

## 2022-09-26 VITALS
WEIGHT: 145 LBS | BODY MASS INDEX: 21.98 KG/M2 | RESPIRATION RATE: 18 BRPM | TEMPERATURE: 97.9 F | HEIGHT: 68 IN | SYSTOLIC BLOOD PRESSURE: 136 MMHG | DIASTOLIC BLOOD PRESSURE: 87 MMHG | OXYGEN SATURATION: 98 % | HEART RATE: 99 BPM

## 2022-09-26 PROCEDURE — 99202 OFFICE O/P NEW SF 15 MIN: CPT

## 2022-09-26 NOTE — HISTORY OF PRESENT ILLNESS
[Disease: _____________________] : Disease: [unfilled] [T: ___] : T[unfilled] [N: ___] : N[unfilled] [M: ___] : M[unfilled] [AJCC Stage: ____] : AJCC Stage: [unfilled] [de-identified] : 52 year old female with a history of left invasive ductal carcinoma s/p 8/9/19 left lumpectomy/SLNB and re-excision for positive margin, now with newly diagnosed DCIS of the left breast and ADH s/p 12/9/2021 left lumpectomy x 3 with positive margins, 2/18/22 left total mastectomy, SLNB and reconstruction, and 8/5/22 left breast TE to implant exchange and right mastopexy, is referred by Dr. Hurtado to discuss adjuvant endocrine therapy.\par \par OncHx:\par 8/9/19 L lumpx & SNBX for 1.2cm well differentated IDC, multiple coalescing foci, ER/OH+, HER2(-), LCIS classic type, ADH, FEA. (0/3) LN, (+) superior margin, s/p L Re-excision - no residual carcinoma, Oncotype 9, no RT or AI. \par \par 6/24/21: Javier DXMG & US ([facility]): heterogeneously dense, L – post-op architectural distortion UOQ, two clusters of microcalcs one in UOQ and second at 3:00 which are characterized as pleomorphic and vary in shape, size, and density, one or two linear calcs may represent suspicious ductal casting vs. Milk of calcium, UOQ segment of calcs measures 5cm x 5cm, posterior cluster at 3:00 measures 2.5cm x 1.5cm x 1.2cm, these calcs have increased in size since 2019 when biopsy was initially recommended, US – L – tiny cyst UOQ. BIRADS 4. \par \par 10/8/2021 ([facility]) \par Left breast UOQ posterior calcifications: Ductal carcinoma in situ (DCIS), cribriform type with high nuclear grade and marked necrosis, extending into lobules. Calcifications associated with DCIS ER 75%, OH 5-10%. \par Left breast UOQ anterior calcifications: -Atypical ductal hyperplasia (ADH) and flat epithelial atypia (FEA). Calcifications associated with atypical epithelium\par \par 10/21/2021 ([facility]) bilateral breast MRI showing \par 1. Suspicious 1 cm region of clumped nonmass enhancement at the right 10:00 axis middle to posterior third for which MR guided core biopsy is recommended.\par 2. Known recently diagnosed left breast recurrent neoplasm with DCIS at the left 4:00 axis spanning a 2.5 cm region and atypical ductal hyperplasia approaching malignancy at the left 2:00 axis along the superior anterior aspect of the prior lumpectomy site. Additional highly suspicious 1.4 cm region of clumped nonmass enhancement at the left 12:00 axis anterior third is located 2.5 cm superior to the anterior biopsy site. If breast conservation is being entertained then MR guided core biopsy is recommended. Additional indeterminate 4 mm focus of enhancement at the left 8:00 axis and faint 2 cm region of linear nonmass enhancement at the left 1-2:00 axis anterior third, for which the management will be based on the biopsy result of the dominant 12:00 axis finding. If the biopsy result is found to be malignant then an additional biopsy will likely not . If the biopsy result is found to be benign and breast conservation is still being entertained, then additional MR biopsy may be warranted. \par BIRADS 4 \par \par 11/5/2021 MR GUIDED BILATERAL BREAST BIOPSY 2 SITES ([facility]): \par Right breast 10:00 position: benign breast tissue with stromal fibrosis. \par Left breast 12:00 position: ductal carcinoma in situ (DCIS), cribriform type with high nuclear grade and necrosis, extending into lobules and rare calcifications associated with DCIS and surrounding epithelium. ADH\par \par 12/9/2021 ([facility]). \par 1. Left breast, upper outer quadrant, excision:\par - Ductal carcinoma in situ (DCIS) solid and cribriform types with high nuclear grade, necrosis, extending into lobules, multiple foci.\par - Positive inferior, medial, anterior margins, superior margin: 1.5 mm.  All remaining margins: > 10 mm \par - Lobular carcinoma in situ (LCIS), atypical lobular hyperplasia (ALH) and flat epithelial atypia (FEA). \par - Biopsy site changes x 3 \par - Calcifications associated with DCIS and benign epithelium. \par 2. Left breast, new inferior medial margin: Benign breast tissue.\par \par 2/18/2022 ([facility]) left mastectomy/SLNB:\par Focal atypical ductal hyperplasia (ADH), Lobular carcinoma in situ (LCIS) and atypical lobular hyperplasia (ALH), Fibrocystic changes with columnar cell change, Prior biopsy site changes, Unremarkable nipple, Unremarkable skeletal muscle. 0/1 SLN.\par \par 5/31/22 ([facility]) R DX MMG: heterogeneously dense. No mammographic evidence of malignancy of the right breast. \par  [de-identified] : ductal carcinoma in situ [de-identified] : ER+, AL+

## 2022-09-26 NOTE — PHYSICAL EXAM
[Normal] : affect appropriate [de-identified] : s/p left mastectomy with reconstruction; s/p right breast lift; no evidence of local regional recurrence

## 2022-10-03 ENCOUNTER — NON-APPOINTMENT (OUTPATIENT)
Age: 52
End: 2022-10-03

## 2022-10-12 ENCOUNTER — APPOINTMENT (OUTPATIENT)
Dept: BREAST CENTER | Facility: CLINIC | Age: 52
End: 2022-10-12

## 2022-10-27 ENCOUNTER — APPOINTMENT (OUTPATIENT)
Dept: PLASTIC SURGERY | Facility: CLINIC | Age: 52
End: 2022-10-27

## 2022-10-27 VITALS — WEIGHT: 143 LBS | OXYGEN SATURATION: 97 % | HEART RATE: 69 BPM | BODY MASS INDEX: 21.67 KG/M2 | HEIGHT: 68 IN

## 2022-10-27 PROCEDURE — 99024 POSTOP FOLLOW-UP VISIT: CPT

## 2022-10-27 NOTE — HISTORY OF PRESENT ILLNESS
[FreeTextEntry1] : 53 y/o female s/p left breast TE to implant exchange and right mastopexy on 08/05/22. Denies any f/c/n/v. Patient is not taking any pain medication.

## 2022-10-27 NOTE — ASSESSMENT
[FreeTextEntry1] : small area delayed healing right breast t-point now fully healed\par Moisturizer to incisions\par No underwire \par \par RTC in December to discuss left nipple reconstruction

## 2022-10-27 NOTE — SURGICAL HISTORY
[de-identified] : 02/18/22; left breast reconstruction with tissue expander  [de-identified] : 08/05/22; left breast TE to implant exchange and right mastopexy; Left [place] 450 [place] [facility] [employer]

## 2022-10-27 NOTE — PHYSICAL EXAM
[de-identified] : Left implant in place, no collections or s/sx of infection, left NAC surgically absent, right nipple warm, viable, breakdown at t-point now fully healed\par

## 2022-11-02 ENCOUNTER — APPOINTMENT (OUTPATIENT)
Dept: BREAST CENTER | Facility: CLINIC | Age: 52
End: 2022-11-02

## 2022-11-02 VITALS
OXYGEN SATURATION: 98 % | SYSTOLIC BLOOD PRESSURE: 126 MMHG | BODY MASS INDEX: 21.22 KG/M2 | HEART RATE: 72 BPM | DIASTOLIC BLOOD PRESSURE: 86 MMHG | HEIGHT: 68 IN | WEIGHT: 140 LBS

## 2022-11-02 DIAGNOSIS — Z90.12 ACQUIRED ABSENCE OF LEFT BREAST AND NIPPLE: ICD-10-CM

## 2022-11-02 PROCEDURE — 93702 BIS XTRACELL FLUID ANALYSIS: CPT | Mod: NC

## 2022-11-02 PROCEDURE — 99213 OFFICE O/P EST LOW 20 MIN: CPT

## 2022-11-06 PROBLEM — Z90.12 STATUS POST LEFT MASTECTOMY: Status: ACTIVE | Noted: 2022-03-09

## 2022-11-13 NOTE — DATA REVIEWED
[FreeTextEntry1] : 6/24/21: Javier DXMG & US ([facility]): heterogeneously dense, L – post-op architectural distortion UOQ, two clusters of microcalcs one in UOQ and second at 3:00 which are characterized as pleomorphi and vary in shape, size, and density, one or two linear calcs may represent suspicious ductal casting vs. Milk of calcium, UOQ segment of calcs measures 5cm x 5cm, posterior cluster at 3:00 measures 2.5cm x 1.5cm x 1.2cm, these calcs have increased in size since 2019 when biopsy was initially recommended, US – L – tiny cyst UOQ. BIRADS 4. \par \par 10/8/2021 (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 Left breast UOQ anterior pathology: - Atypical ductal hyperplasia (ADH) and flat epithelial atypia (FEA) - Calcifications associated with atypical epithelium\par \par 10/21/2021 ([facility]) bilateral breast MRI showing 1. Suspicious 1 cm region of clumped nonmass enhancement at the right 10:00 axis middle to posterior third for which MR guided core biopsy is recommended.\par 2. Known recently diagnosed left breast recurrent neoplasm with DCIS at the left 4:00 axis spanning a 2.5 cm region and atypical ductal hyperplasia approaching malignancy at the left 2:00 axis along the superior anterior aspect of the prior lumpectomy site. Additional highly suspicious 1.4 cm region of clumped nonmass enhancement at the left 12:00 axis anterior third is located 2.5 cm superior to the anterior biopsy site. If breast conservation is being entertained then MR guided core biopsy is recommended. Additional indeterminate 4 mm focus of enhancement at the left 8:00 axis and faint 2 cm region of linear nonmass enhancement at the left 1-2:00 axis anterior third, for which the management will be based on the biopsy result of the dominant 12:00 axis finding. If the biopsy result is found to be malignant then an additional biopsy will likely not . If the biopsy result is found to be benign and breast conservation is still being entertained, then additional MR biopsy may be warranted. \par 1. MR biopsy of the right 10:00 axis 1 cm clumped nonmass enhancement.\par 2. If clinically warranted, MR biopsy of the left 12:00 axis highly suspicious 1.4 cm clumped nonmass enhancement.\par 3. Pending the results of the left MR biopsy will determine whether there is a need for an additional MR biopsy.\par 4. Appropriate action should be taken for the left breast cancer. The patient is under the surgical management referring clinician for her recurrent left breast cancer.\par BIRADS 4 \par \par 11/5/2021 (addendum 11/9/21 incl path results) MR GUIDED BILATERAL BREAST BIOPSY 2 SITES (R): \par The result in the right breast 10:00 position is benign breast tissue with stromal fibrosis. This is concordant with imaging findings. Follow-up bilateral breast MRI with and without intravenous contrast is recommended in 6 months.\par The result in the left breast 12:00 position is ductal carcinoma in situ (DCIS), cribriform type with high nuclear grade and necrosis, extending into lobules and rare calcifications associated with DCIS and surrounding epithelium. This is malignant and is concordant with imaging findings. Surgical consultation is recommended. ADH\par \par 12/9/2021 ([facility]). Left breast, upper outer quadrant, excision:\par - Ductal carcinoma in situ (DCIS) solid and cribriform types with high\par nuclear grade, necrosis, extending into lobules, multiple foci.\par - Margins, as present on this specimen:Inferior: Positive Medial: Positive Anterior: Positive Superior: 1.5 mm All remaining margins: > 10 mm - Lobular carcinoma in situ (LCIS), atypical lobular hyperplasia (ALH) and flat epithelial atypia (FEA). - Biopsy site changes x 3 - Calcifications associated with DCIS and benign epithelium. 2. Left breast, new inferior medial margin: - Benign breast tissue.\par \par 2/18/2022 ([facility]) Surgical Pathology Report: SENTINEL NODE #1 LEFT AXILLA COUNT 12 negative for tumor. LEFT MASTECTOMY: Focal atypical ductal hyperplasia (ADH), Lobular carcinoma in situ (LCIS) and atypical lobular hyperplasia (ALH), Fibrocystic changes with columnar cell change, Prior biopsy site changes, Unremarkable nipple, Unremarkable skeletal muscle. \par \par 5/31/22 ([facility]) R DX MMG: heterogeneously dense. No mammographic evidence of malignancy of the right breast. 11/5/2021 MRI guided core biopsy of right breast 10:00 axis enhancement yielded benignity. Six-month follow-up MRI was recommended and is currently overdue. BI-RADS 1: Negative. \par

## 2022-11-13 NOTE — HISTORY OF PRESENT ILLNESS
[FreeTextEntry1] : 51 yo female presents for 5 month follow-up of left breast and axillary swelling with history of left 12:00 DCIS and ADH, s/p left total mastectomy, SLNB and reconstruction with Dr. Lerman on 2/18/2022, previously s/p localized lumpectomy x 3 on 12/9/2021 with three positive margins. Patient is s/p left breast TE to implant exchange and right mastopexy on 08/05/22 with delayed right breast healing, plan to discuss left nipple reconstruction with plastic surgeon Dr. Lerman in December 2022. \par \par Patient states she is feeling well today, axillary swelling and limited ROM has resolved. Patient attended one physical therapy session and felt she no longer needed therapy. \par \par Patient met with med onc Dr. Harris with recommendation for tamoxifen. Patient has declined. \par \par R DXMMG/US 5/31/22, BIRADS-1. Disregard MRI recommendation. Denies any palpable abnormalities, skin changes, nipple changes or nipple discharge bilaterally. \par \par \par \par \par

## 2022-11-13 NOTE — ASSESSMENT
[FreeTextEntry1] : 53 yo female presents for 3 month follow-up of left breast and axillary swelling with history of left 12:00 DCIS and ADH, s/p left total mastectomy, SLNB and reconstruction with Dr. Lerman on 2/18/2022, previously s/p localized lumpectomy x 3 on 12/9/2021 with three positive margins. Patient is s/p left breast TE to implant exchange and right mastopexy on 08/05/22. Recommended Tamoxifen, patient declined. \par \par Patient is well healed on physical exam and has no focal complaints. Sozo measurement obtained in office today, WNL. Most recent imaging reviewed, R DXMMG/US 5/31/22, BIRADS-1. Disregard mri . Plan for RT DXMMG+ US and re-examination in MAY 2023. Patient verbalizes understanding and is in agreement with the plan.\par

## 2022-11-13 NOTE — PHYSICAL EXAM
[de-identified] : Acquired absence of left breast, implant present, incisions well healed [de-identified] : swelling of the left axilla

## 2022-12-08 ENCOUNTER — NON-APPOINTMENT (OUTPATIENT)
Age: 52
End: 2022-12-08

## 2022-12-20 ENCOUNTER — APPOINTMENT (OUTPATIENT)
Dept: PLASTIC SURGERY | Facility: CLINIC | Age: 52
End: 2022-12-20
Payer: MEDICAID

## 2022-12-27 ENCOUNTER — APPOINTMENT (OUTPATIENT)
Dept: PLASTIC SURGERY | Facility: CLINIC | Age: 52
End: 2022-12-27
Payer: MEDICAID

## 2023-01-10 ENCOUNTER — APPOINTMENT (OUTPATIENT)
Dept: PLASTIC SURGERY | Facility: CLINIC | Age: 53
End: 2023-01-10
Payer: MEDICAID

## 2023-01-10 VITALS — HEART RATE: 112 BPM | BODY MASS INDEX: 21.67 KG/M2 | OXYGEN SATURATION: 98 % | HEIGHT: 68 IN | WEIGHT: 143 LBS

## 2023-01-10 DIAGNOSIS — Z42.1 ENCOUNTER FOR BREAST RECONSTRUCTION FOLLOWING MASTECTOMY: ICD-10-CM

## 2023-01-10 PROCEDURE — 99213 OFFICE O/P EST LOW 20 MIN: CPT

## 2023-01-10 NOTE — HISTORY OF PRESENT ILLNESS
[FreeTextEntry1] : 51 y/o female s/p left breast TE to implant exchange and right mastopexy on 08/05/22. Denies any f/c/n/v. Patient is not taking any pain medication. Patient is here to discuss next stage of surgery and is also interested in cosmetic facelift.

## 2023-01-10 NOTE — ASSESSMENT
[FreeTextEntry1] : Fully healed\par \par Ready for next stage of her recon including:\par left nipple reconstruction\par fat grafting to breast from abdomen\par \par I reviewed the risks benefits and alternatives of this surgery. \par \par Today we discussed cosmetic facelift, she is an excellent candidate. The nature of  surgery, its risks, benefits, alternatives, expected postoperative course, recovery and long term results were discussed in detail. I explained the risk of asymmetry, contour irregularity, hematoma, seroma, skin necrosis, delayed wound healing, numbness and nerve damage. All questions were answered. She will need to return for preop discussion and for photos.\par \par Can do this at the same time as nipple recon\par \par \par

## 2023-01-10 NOTE — PHYSICAL EXAM
[de-identified] : skin laxity, + jowls , no scars, no rashes or ulcers, good candidate for facelift.  [de-identified] : Left implant in good position, good contour and symmetry, no collections or s/sx of infection, left NAC surgically absent, right breast well healed. \par

## 2023-01-10 NOTE — SURGICAL HISTORY
[de-identified] : 02/18/22; left breast reconstruction with tissue expander  [de-identified] : 08/05/22; left breast TE to implant exchange and right mastopexy; Left implant 450 cc [facility] [employer]

## 2023-01-20 ENCOUNTER — NON-APPOINTMENT (OUTPATIENT)
Age: 53
End: 2023-01-20

## 2023-03-29 ENCOUNTER — APPOINTMENT (OUTPATIENT)
Dept: PLASTIC SURGERY | Facility: CLINIC | Age: 53
End: 2023-03-29
Payer: MEDICAID

## 2023-03-29 PROCEDURE — 99442: CPT | Mod: 95

## 2023-03-29 RX ORDER — IBUPROFEN 600 MG/1
600 TABLET, FILM COATED ORAL EVERY 6 HOURS
Qty: 24 | Refills: 0 | Status: ACTIVE | COMMUNITY
Start: 2023-03-29 | End: 1900-01-01

## 2023-03-29 NOTE — HISTORY OF PRESENT ILLNESS
[Home] : at home, [unfilled] , at the time of the visit. [Other Location: e.g. Home (Enter Location, City,State)___] : at [unfilled] [Verbal consent obtained from patient] : the patient, [unfilled] [FreeTextEntry1] : Patient Name: HAILEE CARDONA \par : 1970 \par Date: 2023 \par Attending: Dr. Oren Lerman\par \par HPI: preop consultation for left nipple reconstruction and fat grafting to left breast from abdomen on 23.\par \par Allergies: NKDA\par Medication prescribed: ibuprofen 600 mg\par \par ROS: complete 14 point review of systems negative except pertinent items reviewed in the HPI. Other non-contributory items reviewed in our new patient questionnaire and we have submitted it to be scanned into the medical record. \par \par Physical Exam: \par completed at time of in office evaluation \par \par Assessment/Plan:\par We have discussed pre and postop instructions, recovery limitations, restrictions and expectations, ERAS protocol, NPO status, transportation home, postop medications, COVID-19 policies, benefits and risks of the procedure. Pre-op labs reviewed. The patient would like to proceed with surgery as scheduled.\par \par HIMANSHU TAYLOR NP\par \par

## 2023-04-13 ENCOUNTER — TRANSCRIPTION ENCOUNTER (OUTPATIENT)
Age: 53
End: 2023-04-13

## 2023-04-13 NOTE — ASU PATIENT PROFILE, ADULT - FALL HARM RISK - UNIVERSAL INTERVENTIONS
Bed in lowest position, wheels locked, appropriate side rails in place/Call bell, personal items and telephone in reach/Instruct patient to call for assistance before getting out of bed or chair/Non-slip footwear when patient is out of bed/[place] to call system/Physically safe environment - no spills, clutter or unnecessary equipment/Purposeful Proactive Rounding/Room/bathroom lighting operational, light cord in reach

## 2023-04-13 NOTE — ASU PATIENT PROFILE, ADULT - ABLE TO REACH PT
Instruction and time was left on voicemail, instructed to arrive at 11:30am, no solid food/dairy/candy/gum after midnight, water allowed before 10:30am Friday. Patient reminded to come with photo ID/insurance card; dress in comfortable clothes; no jewelries/valuables/contact lens; no smoking/alcohol drinking/recreation drug use Thursday. Escort to come with photo ID; address and call back number was given./no

## 2023-04-14 ENCOUNTER — OUTPATIENT (OUTPATIENT)
Dept: OUTPATIENT SERVICES | Facility: HOSPITAL | Age: 53
LOS: 1 days | Discharge: HOME | End: 2023-04-14
Payer: MEDICAID

## 2023-04-14 ENCOUNTER — TRANSCRIPTION ENCOUNTER (OUTPATIENT)
Age: 53
End: 2023-04-14

## 2023-04-14 VITALS
SYSTOLIC BLOOD PRESSURE: 132 MMHG | RESPIRATION RATE: 14 BRPM | HEART RATE: 70 BPM | DIASTOLIC BLOOD PRESSURE: 69 MMHG | OXYGEN SATURATION: 98 %

## 2023-04-14 VITALS
RESPIRATION RATE: 16 BRPM | OXYGEN SATURATION: 99 % | HEART RATE: 90 BPM | DIASTOLIC BLOOD PRESSURE: 88 MMHG | HEIGHT: 68 IN | TEMPERATURE: 98 F | WEIGHT: 141.1 LBS | SYSTOLIC BLOOD PRESSURE: 145 MMHG

## 2023-04-14 DIAGNOSIS — Z98.890 OTHER SPECIFIED POSTPROCEDURAL STATES: Chronic | ICD-10-CM

## 2023-04-14 PROCEDURE — 19350 NIPPLE/AREOLA RECONSTRUCTION: CPT | Mod: LT

## 2023-04-14 PROCEDURE — 15772 GRFG AUTOL FAT LIPO EA ADDL: CPT

## 2023-04-14 PROCEDURE — 15771 GRFG AUTOL FAT LIPO 50 CC/<: CPT

## 2023-04-14 RX ORDER — OXYCODONE AND ACETAMINOPHEN 5; 325 MG/1; MG/1
1 TABLET ORAL EVERY 4 HOURS
Refills: 0 | Status: DISCONTINUED | OUTPATIENT
Start: 2023-04-14 | End: 2023-04-14

## 2023-04-14 RX ORDER — APREPITANT 80 MG/1
40 CAPSULE ORAL ONCE
Refills: 0 | Status: COMPLETED | OUTPATIENT
Start: 2023-04-14 | End: 2023-04-14

## 2023-04-14 RX ORDER — FENTANYL CITRATE 50 UG/ML
25 INJECTION INTRAVENOUS
Refills: 0 | Status: DISCONTINUED | OUTPATIENT
Start: 2023-04-14 | End: 2023-04-14

## 2023-04-14 RX ORDER — ONDANSETRON 8 MG/1
4 TABLET, FILM COATED ORAL ONCE
Refills: 0 | Status: DISCONTINUED | OUTPATIENT
Start: 2023-04-14 | End: 2023-04-14

## 2023-04-14 RX ORDER — SODIUM CHLORIDE 9 MG/ML
1000 INJECTION, SOLUTION INTRAVENOUS
Refills: 0 | Status: DISCONTINUED | OUTPATIENT
Start: 2023-04-14 | End: 2023-04-14

## 2023-04-14 RX ORDER — ACETAMINOPHEN 500 MG
1000 TABLET ORAL ONCE
Refills: 0 | Status: COMPLETED | OUTPATIENT
Start: 2023-04-14 | End: 2023-04-14

## 2023-04-14 RX ADMIN — Medication 1000 MILLIGRAM(S): at 13:34

## 2023-04-14 RX ADMIN — APREPITANT 40 MILLIGRAM(S): 80 CAPSULE ORAL at 13:34

## 2023-04-14 RX ADMIN — Medication 1000 MILLIGRAM(S): at 13:33

## 2023-04-14 NOTE — PRE-ANESTHESIA EVALUATION ADULT - NSANTHPEFT_GEN_ALL_CORE
AAOx3 in NAD  NSR, S1, S2 heard, no murmurs, gallops, or rubs auscultated  Lungs clear to auscultation bilaterally  Full ROM

## 2023-04-14 NOTE — ASU DISCHARGE PLAN (ADULT/PEDIATRIC) - "IF YOU OR YOUR GUARDIAN/FAMILY IS A SMOKER, IT IS IMPORTANT FOR YOUR HEALTH TO STOP SMOKING. PLEASE BE AWARE THAT SECOND HAND SMOKE IS ALSO HARMFUL."
General Sunscreen Counseling: Reviewed sun protection including SPF 30 or higher, reapplication every 2 hours while in direct sunlight, sunglasses, wide brim hats, UPF clothing. Detail Level: Zone Statement Selected 29-Mar-2020 23:32

## 2023-04-14 NOTE — ASU DISCHARGE PLAN (ADULT/PEDIATRIC) - CARE PROVIDER_API CALL
Lerman, Oren Z (MD)  Plastic Surgery  [address] [place] [id number]  Phone: [phone]  Fax: [phone]  Follow Up Time:

## 2023-04-14 NOTE — ASU DISCHARGE PLAN (ADULT/PEDIATRIC) - ASU DC SPECIAL INSTRUCTIONSFT
NOTIFY YOUR SURGEON IF YOU HAVE: any bleeding that does not stop, any pus draining from your wound(s), any fever (over 100.4 F) persistent nausea/vomiting, or if your pain is not controlled on your discharge pain medications, unable to urinate.    ACTIVITY: Please refrain from increased physical activity for a period of 2 weeks. Please do not lift anything heavier than a gallon of milk or about 5 pounds. Upon follow up you will be given further instructions on how much physical activity you may do.   MEDICATIONS: Please take any prescribed medications as directed by Dr. Lerman.     Dressing: please leave dressing in place until Sunday. After this time you may get them wet In the shower but do not remove them.     Please follow up with Dr. Lerman at your scheduled appointment.

## 2023-04-14 NOTE — ASU DISCHARGE PLAN (ADULT/PEDIATRIC) - NS MD DC FALL RISK RISK
For information on Fall & Injury Prevention, visit: [email or web address] OR  [email or web address] OR  https://www.cdc.gov/steadi/patient.html

## 2023-04-18 ENCOUNTER — APPOINTMENT (OUTPATIENT)
Dept: PLASTIC SURGERY | Facility: CLINIC | Age: 53
End: 2023-04-18
Payer: MEDICAID

## 2023-04-18 PROCEDURE — 99024 POSTOP FOLLOW-UP VISIT: CPT

## 2023-04-18 NOTE — SURGICAL HISTORY
[de-identified] : 02/18/22: left breast reconstruction with tissue expander  [de-identified] : 08/05/22: left breast TE to implant exchange and right mastopexy; Left implant 450 cc [facility] [employer] [de-identified] : 04/14/23: left nipple reconstruction and fat grafting to left breast from abdomen

## 2023-04-18 NOTE — HISTORY OF PRESENT ILLNESS
[FreeTextEntry1] : 52 y/o female presents 4 days s/p left nipple reconstruction and fat grafting to left breast from abdomen. Denies any f/c/n/v. Patient is taking ibuprofen for the pain. Patient c/o bruising on her abdomen.

## 2023-04-18 NOTE — ASSESSMENT
[FreeTextEntry1] : Healing well\par Normal PO edema and bruising, \par Reviewed PO care instructions\par F/U in 3 weeks with NP\par RTC in 3 months to see me

## 2023-05-11 ENCOUNTER — APPOINTMENT (OUTPATIENT)
Dept: PLASTIC SURGERY | Facility: CLINIC | Age: 53
End: 2023-05-11
Payer: MEDICAID

## 2023-05-11 VITALS — WEIGHT: 139 LBS | BODY MASS INDEX: 21.07 KG/M2 | OXYGEN SATURATION: 96 % | HEIGHT: 68 IN | HEART RATE: 102 BPM

## 2023-05-11 PROCEDURE — 99024 POSTOP FOLLOW-UP VISIT: CPT

## 2023-05-11 NOTE — SURGICAL HISTORY
[de-identified] : 02/18/22: left breast reconstruction with tissue expander  [de-identified] : 08/05/22: left breast TE to implant exchange and right mastopexy; Left implant 450 cc [facility] [employer] [de-identified] : 04/14/23: left nipple reconstruction and fat grafting to left breast from abdomen

## 2023-05-11 NOTE — ASSESSMENT
[FreeTextEntry1] : Healing well\par Massage left UIQ, Dr. Lerman informed \par Reviewed PO care instructions\par Moisturizer/silicone to incisions\par \par RTC in July for Dr. Lerman

## 2023-05-11 NOTE — PHYSICAL EXAM
[de-identified] : Incisions healing well, no collections or s/sx of infection. Nipple warm, viable.  Suture material removed from vertical incision. Soft, mobile subcutaneous mass, left UIQ\par

## 2023-05-15 ENCOUNTER — APPOINTMENT (OUTPATIENT)
Dept: OPHTHALMOLOGY | Facility: CLINIC | Age: 53
End: 2023-05-15
Payer: MEDICAID

## 2023-05-15 ENCOUNTER — NON-APPOINTMENT (OUTPATIENT)
Age: 53
End: 2023-05-15

## 2023-05-15 PROCEDURE — 92004 COMPRE OPH EXAM NEW PT 1/>: CPT

## 2023-05-15 PROCEDURE — 92134 CPTRZ OPH DX IMG PST SGM RTA: CPT

## 2023-05-15 PROCEDURE — 92201 OPSCPY EXTND RTA DRAW UNI/BI: CPT

## 2023-05-17 ENCOUNTER — APPOINTMENT (OUTPATIENT)
Dept: BREAST CENTER | Facility: CLINIC | Age: 53
End: 2023-05-17
Payer: MEDICAID

## 2023-05-17 VITALS
OXYGEN SATURATION: 97 % | SYSTOLIC BLOOD PRESSURE: 138 MMHG | BODY MASS INDEX: 22.34 KG/M2 | DIASTOLIC BLOOD PRESSURE: 88 MMHG | WEIGHT: 139 LBS | HEART RATE: 84 BPM | HEIGHT: 66 IN

## 2023-05-17 DIAGNOSIS — N60.92 UNSPECIFIED BENIGN MAMMARY DYSPLASIA OF LEFT BREAST: ICD-10-CM

## 2023-05-17 DIAGNOSIS — D05.12 INTRADUCTAL CARCINOMA IN SITU OF LEFT BREAST: ICD-10-CM

## 2023-05-17 PROCEDURE — 99213 OFFICE O/P EST LOW 20 MIN: CPT

## 2023-05-17 PROCEDURE — 93702 BIS XTRACELL FLUID ANALYSIS: CPT | Mod: NC

## 2023-05-18 PROBLEM — N60.92 ATYPICAL DUCTAL HYPERPLASIA OF LEFT BREAST: Status: ACTIVE | Noted: 2022-02-23

## 2023-05-18 PROBLEM — D05.12 DUCTAL CARCINOMA IN SITU (DCIS) OF LEFT BREAST: Status: ACTIVE | Noted: 2021-11-11

## 2023-05-19 NOTE — PAST MEDICAL HISTORY
[Menarche Age ____] : age at menarche was [unfilled] [Definite ___ (Date)] : the last menstrual period was [unfilled] [Regular Cycle Intervals] : have been regular [Total Preg ___] : G[unfilled] [Live Births ___] : P[unfilled]  [FreeTextEntry6] : No [FreeTextEntry7] : No [FreeTextEntry8] : Yes

## 2023-05-19 NOTE — HISTORY OF PRESENT ILLNESS
[FreeTextEntry1] : 52 yo female presents for 6 month follow-up of left breast and axillary swelling with history of left 12:00 DCIS and ADH, s/p left total mastectomy, SLNB and reconstruction with Dr. Lerman on 2/18/2022, previously s/p localized lumpectomy x 3 on 12/9/2021 with three positive margins. Patient is s/p left breast TE to implant exchange and right mastopexy on 08/05/22 with delayed right breast healing, plan to discuss left nipple reconstruction with plastic surgeon Dr. Lerman in December 2022. \par \par Patient states she is feeling well today, axillary swelling and limited ROM has resolved. Patient attended one physical therapy session and felt she no longer needed therapy. \par \par Patient met with med onc Dr. Harris with recommendation for tamoxifen. Patient has declined. \par \par R DXMMG/US 4/11/23, BIRADS-2.\par Denies any palpable abnormalities, skin changes, nipple changes or nipple discharge bilaterally. \par \par \par \par

## 2023-05-19 NOTE — ASSESSMENT
[FreeTextEntry1] : 54 yo female presents for 6 month follow-up of left breast and axillary swelling with history of left 12:00 DCIS and ADH, s/p left total mastectomy, SLNB and reconstruction with Dr. Lerman on 2/18/2022, previously s/p localized lumpectomy x 3 on 12/9/2021 with three positive margins. Patient is s/p left breast TE to implant exchange and right mastopexy on 08/05/22. Recommended Tamoxifen, patient declined. \par \par Patient is well healed on physical exam and has no focal complaints. Sozo measurement obtained in office today, WNL. Most recent imaging reviewed, R DXMMG/US 4/11/23, BIRADS-2. Discussed importance and implication of genetic testing in regards to her age of diagnosis, patient does not wish to have genetic testing at this time. Patient will have B/L MRI in 6 months with reexamination (PA) and repeat sozo measurement at this time. Patient verbalized understanding and agreement of plan.\par

## 2023-05-19 NOTE — PHYSICAL EXAM
[Supple] : supple [No Supraclavicular Adenopathy] : no supraclavicular adenopathy [Asymmetrical] : asymmetrical [No dominant masses] : no dominant masses in right breast  [No Nipple Retraction] : no right nipple retraction [No Nipple Discharge] : no right nipple discharge [No Axillary Lymphadenopathy] : no right axillary lymphadenopathy [de-identified] : Acquired absence of left breast, implant present, incisions well healed with reconstructed nipple [de-identified] : swelling of the left axilla

## 2023-05-19 NOTE — DATA REVIEWED
[FreeTextEntry1] : 6/24/21: Javier DXMG & US ([facility]): heterogeneously dense, L – post-op architectural distortion UOQ, two clusters of microcalcs one in UOQ and second at 3:00 which are characterized as pleomorphi and vary in shape, size, and density, one or two linear calcs may represent suspicious ductal casting vs. Milk of calcium, UOQ segment of calcs measures 5cm x 5cm, posterior cluster at 3:00 measures 2.5cm x 1.5cm x 1.2cm, these calcs have increased in size since 2019 when biopsy was initially recommended, US – L – tiny cyst UOQ. BIRADS 4. 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 Left breast UOQ anterior pathology: - Atypical ductal hyperplasia (ADH) and flat epithelial atypia (FEA) - Calcifications associated with atypical epithelium\par \par 10/21/2021 ([facility]) bilateral breast MRI showing 1. Suspicious 1 cm region of clumped nonmass enhancement at the right 10:00 axis middle to posterior third for which MR guided core biopsy is recommended.\par 2. Known recently diagnosed left breast recurrent neoplasm with DCIS at the left 4:00 axis spanning a 2.5 cm region and atypical ductal hyperplasia approaching malignancy at the left 2:00 axis along the superior anterior aspect of the prior lumpectomy site. Additional highly suspicious 1.4 cm region of clumped nonmass enhancement at the left 12:00 axis anterior third is located 2.5 cm superior to the anterior biopsy site. If breast conservation is being entertained then MR guided core biopsy is recommended. Additional indeterminate 4 mm focus of enhancement at the left 8:00 axis and faint 2 cm region of linear nonmass enhancement at the left 1-2:00 axis anterior third, for which the management will be based on the biopsy result of the dominant 12:00 axis finding. If the biopsy result is found to be malignant then an additional biopsy will likely not . If the biopsy result is found to be benign and breast conservation is still being entertained, then additional MR biopsy may be warranted. \par 1. MR biopsy of the right 10:00 axis 1 cm clumped nonmass enhancement.\par 2. If clinically warranted, MR biopsy of the left 12:00 axis highly suspicious 1.4 cm clumped nonmass enhancement.\par 3. Pending the results of the left MR biopsy will determine whether there is a need for an additional MR biopsy.\par 4. Appropriate action should be taken for the left breast cancer. The patient is under the surgical management referring clinician for her recurrent left breast cancer.\par BIRADS 4 \par \par 11/5/2021 (addendum 11/9/21 incl path results) MR GUIDED BILATERAL BREAST BIOPSY 2 SITES (R): \par The result in the right breast 10:00 position is benign breast tissue with stromal fibrosis. This is concordant with imaging findings. Follow-up bilateral breast MRI with and without intravenous contrast is recommended in 6 months.\par The result in the left breast 12:00 position is ductal carcinoma in situ (DCIS), cribriform type with high nuclear grade and necrosis, extending into lobules and rare calcifications associated with DCIS and surrounding epithelium. This is malignant and is concordant with imaging findings. Surgical consultation is recommended. ADH\par \par 12/9/2021 ([facility]). Left breast, upper outer quadrant, excision:\par - Ductal carcinoma in situ (DCIS) solid and cribriform types with high\par nuclear grade, necrosis, extending into lobules, multiple foci.\par - Margins, as present on this specimen:Inferior: Positive Medial: Positive Anterior: Positive Superior: 1.5 mm All remaining margins: > 10 mm - Lobular carcinoma in situ (LCIS), atypical lobular hyperplasia (ALH) and flat epithelial atypia (FEA). - Biopsy site changes x 3 - Calcifications associated with DCIS and benign epithelium. 2. Left breast, new inferior medial margin: - Benign breast tissue.\par \par 2/18/2022 ([facility]) Surgical Pathology Report: SENTINEL NODE #1 LEFT AXILLA COUNT 12 negative for tumor. LEFT MASTECTOMY: Focal atypical ductal hyperplasia (ADH), Lobular carcinoma in situ (LCIS) and atypical lobular hyperplasia (ALH), Fibrocystic changes with columnar cell change, Prior biopsy site changes, Unremarkable nipple, Unremarkable skeletal muscle. \par \par 5/31/22 ([facility]) R DX MMG: heterogeneously dense. No mammographic evidence of malignancy of the right breast. 11/5/2021 MRI guided core biopsy of right breast 10:00 axis enhancement yielded benignity. Six-month follow-up MRI was recommended and is currently overdue. BI-RADS 1: Negative. \par \par 4/11/23 ([facility]) R DX MMG/US: heterogeneously dense. Benign findings: Right breast. Postsurgical change from the right balancing reduction mammoplasty. No mammographic or ultrasonographic evidence of malignancy in the right breast. Status post left mastectomy. FOLLOW-UP:  Annual screening mammography is recommended. In addition due to the history of breast cancer in conjunction with dense breast tissue, can consider annual MRI scan at the discretion of the consulting surgeon. BIRADS 2:  Benign.

## 2023-05-23 ENCOUNTER — APPOINTMENT (OUTPATIENT)
Dept: OPHTHALMOLOGY | Facility: CLINIC | Age: 53
End: 2023-05-23
Payer: MEDICAID

## 2023-05-23 ENCOUNTER — NON-APPOINTMENT (OUTPATIENT)
Age: 53
End: 2023-05-23

## 2023-05-23 PROCEDURE — 92012 INTRM OPH EXAM EST PATIENT: CPT | Mod: 25

## 2023-05-23 PROCEDURE — 67145 PROPH RTA DTCHMNT PC: CPT | Mod: LT

## 2023-06-19 ENCOUNTER — APPOINTMENT (OUTPATIENT)
Dept: OPHTHALMOLOGY | Facility: CLINIC | Age: 53
End: 2023-06-19
Payer: MEDICAID

## 2023-06-19 ENCOUNTER — NON-APPOINTMENT (OUTPATIENT)
Age: 53
End: 2023-06-19

## 2023-06-19 PROCEDURE — 92012 INTRM OPH EXAM EST PATIENT: CPT

## 2023-07-18 ENCOUNTER — APPOINTMENT (OUTPATIENT)
Dept: PLASTIC SURGERY | Facility: CLINIC | Age: 53
End: 2023-07-18
Payer: MEDICAID

## 2023-08-01 ENCOUNTER — APPOINTMENT (OUTPATIENT)
Dept: PLASTIC SURGERY | Facility: CLINIC | Age: 53
End: 2023-08-01
Payer: MEDICAID

## 2023-08-07 ENCOUNTER — APPOINTMENT (OUTPATIENT)
Dept: OPHTHALMOLOGY | Facility: CLINIC | Age: 53
End: 2023-08-07

## 2023-09-05 ENCOUNTER — APPOINTMENT (OUTPATIENT)
Dept: PLASTIC SURGERY | Facility: CLINIC | Age: 53
End: 2023-09-05
Payer: MEDICAID

## 2023-09-05 DIAGNOSIS — N65.1 DEFORMITY OF RECONSTRUCTED BREAST: ICD-10-CM

## 2023-09-05 DIAGNOSIS — N65.0 DEFORMITY OF RECONSTRUCTED BREAST: ICD-10-CM

## 2023-09-05 PROCEDURE — 99213 OFFICE O/P EST LOW 20 MIN: CPT

## 2023-09-05 NOTE — ASSESSMENT
[FreeTextEntry1] : Healing well Ready for left NAC tattoo Silicone to incisions  RTC in January 2024

## 2023-09-05 NOTE — PHYSICAL EXAM
[de-identified] : well healed, no collections or s/sx of infection. Nipple warm, viable. Stepoff deformity left upper pole. Soft, mobile subcutaneous mass, left UIQ?

## 2023-09-05 NOTE — HISTORY OF PRESENT ILLNESS
[FreeTextEntry1] : 54 y/o female s/p left nipple reconstruction and fat grafting to left breast from abdomen on 04/14/23. Denies any f/c/n/v. Patient is not taking any pain medications. Patient has questions regarding stepoff on left breast. .

## 2023-09-05 NOTE — SURGICAL HISTORY
[de-identified] : 02/18/22: left breast reconstruction with tissue expander  [de-identified] : 08/05/22: left breast TE to implant exchange and right mastopexy; Left implant 450 cc [facility] [employer] [de-identified] : 04/14/23: left nipple reconstruction and fat grafting to left breast from abdomen

## 2023-10-02 ENCOUNTER — APPOINTMENT (OUTPATIENT)
Dept: OPHTHALMOLOGY | Facility: CLINIC | Age: 53
End: 2023-10-02

## 2023-10-23 ENCOUNTER — NON-APPOINTMENT (OUTPATIENT)
Age: 53
End: 2023-10-23

## 2023-12-04 ENCOUNTER — APPOINTMENT (OUTPATIENT)
Dept: BREAST CENTER | Facility: CLINIC | Age: 53
End: 2023-12-04

## 2023-12-14 ENCOUNTER — NON-APPOINTMENT (OUTPATIENT)
Age: 53
End: 2023-12-14

## 2023-12-18 PROBLEM — Z08 ENCOUNTER FOR FOLLOW-UP SURVEILLANCE OF BREAST CANCER: Status: ACTIVE | Noted: 2021-03-17

## 2023-12-18 PROBLEM — Z90.12 ACQUIRED ABSENCE OF LEFT BREAST AND NIPPLE: Status: ACTIVE | Noted: 2022-05-17

## 2023-12-18 PROBLEM — Z85.3 PERSONAL HISTORY OF BREAST CANCER: Status: ACTIVE | Noted: 2021-03-17

## 2023-12-22 ENCOUNTER — APPOINTMENT (OUTPATIENT)
Dept: BREAST CENTER | Facility: CLINIC | Age: 53
End: 2023-12-22
Payer: MEDICAID

## 2023-12-22 VITALS
HEART RATE: 87 BPM | DIASTOLIC BLOOD PRESSURE: 81 MMHG | HEIGHT: 66 IN | BODY MASS INDEX: 24.11 KG/M2 | WEIGHT: 150 LBS | OXYGEN SATURATION: 96 % | SYSTOLIC BLOOD PRESSURE: 128 MMHG

## 2023-12-22 DIAGNOSIS — Z08 ENCOUNTER FOR FOLLOW-UP EXAMINATION AFTER COMPLETED TREATMENT FOR MALIGNANT NEOPLASM: ICD-10-CM

## 2023-12-22 DIAGNOSIS — Z85.3 PERSONAL HISTORY OF MALIGNANT NEOPLASM OF BREAST: ICD-10-CM

## 2023-12-22 DIAGNOSIS — Z90.12 ACQUIRED ABSENCE OF LEFT BREAST AND NIPPLE: ICD-10-CM

## 2023-12-22 DIAGNOSIS — Z85.3 ENCOUNTER FOR FOLLOW-UP EXAMINATION AFTER COMPLETED TREATMENT FOR MALIGNANT NEOPLASM: ICD-10-CM

## 2023-12-22 PROCEDURE — 99213 OFFICE O/P EST LOW 20 MIN: CPT

## 2023-12-22 NOTE — DATA REVIEWED
[FreeTextEntry1] : 6/24/21: Javier DXMG & US ([facility]): heterogeneously dense, L - post-op architectural distortion UOQ, two clusters of microcalcs one in UOQ and second at 3:00 which are characterized as pleomorphi and vary in shape, size, and density, one or two linear calcs may represent suspicious ductal casting vs. Milk of calcium, UOQ segment of calcs measures 5cm x 5cm, posterior cluster at 3:00 measures 2.5cm x 1.5cm x 1.2cm, these calcs have increased in size since 2019 when biopsy was initially recommended, US - L - tiny cyst UOQ. BIRADS 4.   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 Left breast UOQ anterior pathology: - Atypical ductal hyperplasia (ADH) and flat epithelial atypia (FEA) - Calcifications associated with atypical epithelium  10/21/2021 ([facility]) bilateral breast MRI showing 1. Suspicious 1 cm region of clumped nonmass enhancement at the right 10:00 axis middle to posterior third for which MR guided core biopsy is recommended. 2. Known recently diagnosed left breast recurrent neoplasm with DCIS at the left 4:00 axis spanning a 2.5 cm region and atypical ductal hyperplasia approaching malignancy at the left 2:00 axis along the superior anterior aspect of the prior lumpectomy site. Additional highly suspicious 1.4 cm region of clumped nonmass enhancement at the left 12:00 axis anterior third is located 2.5 cm superior to the anterior biopsy site. If breast conservation is being entertained then MR guided core biopsy is recommended. Additional indeterminate 4 mm focus of enhancement at the left 8:00 axis and faint 2 cm region of linear nonmass enhancement at the left 1-2:00 axis anterior third, for which the management will be based on the biopsy result of the dominant 12:00 axis finding. If the biopsy result is found to be malignant then an additional biopsy will likely not . If the biopsy result is found to be benign and breast conservation is still being entertained, then additional MR biopsy may be warranted.  1. MR biopsy of the right 10:00 axis 1 cm clumped nonmass enhancement. 2. If clinically warranted, MR biopsy of the left 12:00 axis highly suspicious 1.4 cm clumped nonmass enhancement. 3. Pending the results of the left MR biopsy will determine whether there is a need for an additional MR biopsy. 4. Appropriate action should be taken for the left breast cancer. The patient is under the surgical management referring clinician for her recurrent left breast cancer. BIRADS 4   11/5/2021 (addendum 11/9/21 incl path results) MR GUIDED BILATERAL BREAST BIOPSY 2 SITES (R):  The result in the right breast 10:00 position is benign breast tissue with stromal fibrosis. This is concordant with imaging findings. Follow-up bilateral breast MRI with and without intravenous contrast is recommended in 6 months. The result in the left breast 12:00 position is ductal carcinoma in situ (DCIS), cribriform type with high nuclear grade and necrosis, extending into lobules and rare calcifications associated with DCIS and surrounding epithelium. This is malignant and is concordant with imaging findings. Surgical consultation is recommended. ADH  12/9/2021 ([facility]). Left breast, upper outer quadrant, excision: - Ductal carcinoma in situ (DCIS) solid and cribriform types with high nuclear grade, necrosis, extending into lobules, multiple foci. - Margins, as present on this specimen:Inferior: Positive Medial: Positive Anterior: Positive Superior: 1.5 mm All remaining margins: > 10 mm - Lobular carcinoma in situ (LCIS), atypical lobular hyperplasia (ALH) and flat epithelial atypia (FEA). - Biopsy site changes x 3 - Calcifications associated with DCIS and benign epithelium. 2. Left breast, new inferior medial margin: - Benign breast tissue.  2/18/2022 ([facility]) Surgical Pathology Report: SENTINEL NODE #1 LEFT AXILLA COUNT 12 negative for tumor. LEFT MASTECTOMY: Focal atypical ductal hyperplasia (ADH), Lobular carcinoma in situ (LCIS) and atypical lobular hyperplasia (ALH), Fibrocystic changes with columnar cell change, Prior biopsy site changes, Unremarkable nipple, Unremarkable skeletal muscle.   5/31/22 (R) R DX MMG: heterogeneously dense. No mammographic evidence of malignancy of the right breast. 11/5/2021 MRI guided core biopsy of right breast 10:00 axis enhancement yielded benignity. Six-month follow-up MRI was recommended and is currently overdue. BI-RADS 1: Negative.   4/11/23 (R) R DX MMG/US: heterogeneously dense. Benign findings: Right breast. Postsurgical change from the right balancing reduction mammoplasty. No mammographic or ultrasonographic evidence of malignancy in the right breast. Status post left mastectomy. FOLLOW-UP:  Annual screening mammography is recommended. In addition due to the history of breast cancer in conjunction with dense breast tissue, can consider annual MRI scan at the discretion of the consulting surgeon. BIRADS 2:  Benign.   12/12/23 (R) B/L MRI: heterogeneously dense. Biopsy clip in UOQ R breast. No MR evidence of malignancy s/p L mastectomy with reconstruction, BI-RADS 2: Benign.

## 2023-12-22 NOTE — HISTORY OF PRESENT ILLNESS
[FreeTextEntry1] : 52 yo female presents for follow-up of left breast and axillary swelling with history of left 12:00 DCIS and ADH, s/p left total mastectomy, SLNB and reconstruction with Dr. Lerman on 2/18/2022, previously s/p localized lumpectomy x 3 on 12/9/2021 with three positive margins. Patient is s/p left breast TE to implant exchange and right mastopexy on 08/05/22 with delayed right breast healing and s/p left nipple reconstruction and fat grafting to left breast from abdomen on 04/14/23.   Patient states she is feeling well today, axillary swelling and limited ROM has resolved. Patient attended one physical therapy session and felt she no longer needed therapy.   Patient met with med onc Dr. Harris with recommendation for tamoxifen. Patient has declined.  At LOV 5/17/23, discussed importance of genetic testing; patient declined.   R DXMMG/US 4/11/23, BIRADS-2. Most recent imaging: B/L MRI 12/12/23 BIRADS-2: benign. Denies any palpable abnormalities, skin changes, nipple changes or nipple discharge bilaterally.

## 2023-12-22 NOTE — ASSESSMENT
[FreeTextEntry1] : 52 yo female presents for follow-up of left breast and axillary swelling with history of left 12:00 DCIS and ADH, s/p left total mastectomy, SLNB and reconstruction with Dr. Lerman on 2/18/2022, previously s/p localized lumpectomy x 3 on 12/9/2021 with three positive margins. Patient is s/p left breast TE to implant exchange and right mastopexy on 08/05/22. S/p left nipple reconstruction and fat grafting to left breast from abdomen on 04/14/23. Recommended Tamoxifen, patient declined.  Patient is well healed on physical exam and has no focal complaints. Sozo measurement obtained in office today, WNL. Most recent imaging reviewed, B/L MRI 12/12/23 BIRADS-2. Plan for R DXMMG/US, re-examination and repeat sozo measurement in April 2024. Patient verbalizes understanding and is in agreement with the plan.

## 2023-12-22 NOTE — PHYSICAL EXAM
[de-identified] : Acquired absence of left breast, implant present, incisions well healed [de-identified] : swelling of the left axilla

## 2024-05-13 ENCOUNTER — APPOINTMENT (OUTPATIENT)
Dept: PLASTIC SURGERY | Facility: CLINIC | Age: 54
End: 2024-05-13

## 2024-06-11 ENCOUNTER — APPOINTMENT (OUTPATIENT)
Dept: PLASTIC SURGERY | Facility: CLINIC | Age: 54
End: 2024-06-11

## 2024-08-14 ENCOUNTER — APPOINTMENT (OUTPATIENT)
Dept: BREAST CENTER | Facility: CLINIC | Age: 54
End: 2024-08-14

## 2024-09-25 ENCOUNTER — APPOINTMENT (OUTPATIENT)
Dept: BREAST CENTER | Facility: CLINIC | Age: 54
End: 2024-09-25

## 2024-10-21 ENCOUNTER — NON-APPOINTMENT (OUTPATIENT)
Age: 54
End: 2024-10-21

## 2024-10-28 ENCOUNTER — NON-APPOINTMENT (OUTPATIENT)
Age: 54
End: 2024-10-28

## 2024-10-28 ENCOUNTER — APPOINTMENT (OUTPATIENT)
Dept: PLASTIC SURGERY | Facility: CLINIC | Age: 54
End: 2024-10-28

## 2024-10-30 ENCOUNTER — APPOINTMENT (OUTPATIENT)
Dept: BREAST CENTER | Facility: CLINIC | Age: 54
End: 2024-10-30

## 2025-01-31 ENCOUNTER — APPOINTMENT (OUTPATIENT)
Dept: OPHTHALMOLOGY | Facility: CLINIC | Age: 55
End: 2025-01-31

## 2025-02-12 ENCOUNTER — NON-APPOINTMENT (OUTPATIENT)
Age: 55
End: 2025-02-12

## 2025-02-12 ENCOUNTER — APPOINTMENT (OUTPATIENT)
Dept: BREAST CENTER | Facility: CLINIC | Age: 55
End: 2025-02-12
Payer: MEDICAID

## 2025-02-12 DIAGNOSIS — Z90.12 ACQUIRED ABSENCE OF LEFT BREAST AND NIPPLE: ICD-10-CM

## 2025-02-12 DIAGNOSIS — Z08 ENCOUNTER FOR FOLLOW-UP EXAMINATION AFTER COMPLETED TREATMENT FOR MALIGNANT NEOPLASM: ICD-10-CM

## 2025-02-12 DIAGNOSIS — Z85.3 ENCOUNTER FOR FOLLOW-UP EXAMINATION AFTER COMPLETED TREATMENT FOR MALIGNANT NEOPLASM: ICD-10-CM

## 2025-02-12 DIAGNOSIS — D05.12 INTRADUCTAL CARCINOMA IN SITU OF LEFT BREAST: ICD-10-CM

## 2025-02-12 DIAGNOSIS — Z80.3 FAMILY HISTORY OF MALIGNANT NEOPLASM OF BREAST: ICD-10-CM

## 2025-02-12 PROCEDURE — 99213 OFFICE O/P EST LOW 20 MIN: CPT

## 2025-02-18 ENCOUNTER — NON-APPOINTMENT (OUTPATIENT)
Age: 55
End: 2025-02-18

## 2025-02-18 ENCOUNTER — APPOINTMENT (OUTPATIENT)
Dept: OPHTHALMOLOGY | Facility: CLINIC | Age: 55
End: 2025-02-18
Payer: MEDICAID

## 2025-02-18 PROCEDURE — 92201 OPSCPY EXTND RTA DRAW UNI/BI: CPT

## 2025-02-18 PROCEDURE — 92014 COMPRE OPH EXAM EST PT 1/>: CPT

## 2025-02-18 PROCEDURE — 92134 CPTRZ OPH DX IMG PST SGM RTA: CPT

## 2025-02-25 ENCOUNTER — APPOINTMENT (OUTPATIENT)
Dept: PLASTIC SURGERY | Facility: CLINIC | Age: 55
End: 2025-02-25
Payer: MEDICAID

## 2025-02-25 DIAGNOSIS — Z98.890 OTHER SPECIFIED POSTPROCEDURAL STATES: ICD-10-CM

## 2025-02-25 PROCEDURE — 99213 OFFICE O/P EST LOW 20 MIN: CPT

## 2025-03-06 ENCOUNTER — APPOINTMENT (OUTPATIENT)
Dept: PLASTIC SURGERY | Facility: CLINIC | Age: 55
End: 2025-03-06
Payer: MEDICAID

## 2025-03-06 DIAGNOSIS — N65.1 DEFORMITY OF RECONSTRUCTED BREAST: ICD-10-CM

## 2025-03-06 DIAGNOSIS — N65.0 DEFORMITY OF RECONSTRUCTED BREAST: ICD-10-CM

## 2025-03-06 PROCEDURE — 99213 OFFICE O/P EST LOW 20 MIN: CPT

## 2025-06-10 ENCOUNTER — APPOINTMENT (OUTPATIENT)
Dept: DERMATOLOGY | Facility: CLINIC | Age: 55
End: 2025-06-10

## 2025-07-01 ENCOUNTER — APPOINTMENT (OUTPATIENT)
Dept: DERMATOLOGY | Facility: CLINIC | Age: 55
End: 2025-07-01

## 2025-08-07 ENCOUNTER — APPOINTMENT (OUTPATIENT)
Dept: DERMATOLOGY | Facility: CLINIC | Age: 55
End: 2025-08-07

## 2025-09-03 ENCOUNTER — APPOINTMENT (OUTPATIENT)
Dept: BREAST CENTER | Facility: CLINIC | Age: 55
End: 2025-09-03

## 2025-09-03 VITALS
WEIGHT: 138 LBS | DIASTOLIC BLOOD PRESSURE: 93 MMHG | HEART RATE: 61 BPM | HEIGHT: 66 IN | BODY MASS INDEX: 22.18 KG/M2 | SYSTOLIC BLOOD PRESSURE: 160 MMHG

## 2025-09-03 VITALS
DIASTOLIC BLOOD PRESSURE: 74 MMHG | BODY MASS INDEX: 22.18 KG/M2 | SYSTOLIC BLOOD PRESSURE: 120 MMHG | HEIGHT: 66 IN | WEIGHT: 138 LBS | HEART RATE: 59 BPM

## 2025-09-03 DIAGNOSIS — D05.12 INTRADUCTAL CARCINOMA IN SITU OF LEFT BREAST: ICD-10-CM

## 2025-09-03 DIAGNOSIS — Z85.3 ENCOUNTER FOR FOLLOW-UP EXAMINATION AFTER COMPLETED TREATMENT FOR MALIGNANT NEOPLASM: ICD-10-CM

## 2025-09-03 DIAGNOSIS — Z08 ENCOUNTER FOR FOLLOW-UP EXAMINATION AFTER COMPLETED TREATMENT FOR MALIGNANT NEOPLASM: ICD-10-CM

## 2025-09-03 DIAGNOSIS — Z90.12 ACQUIRED ABSENCE OF LEFT BREAST AND NIPPLE: ICD-10-CM

## 2025-09-03 DIAGNOSIS — N60.92 UNSPECIFIED BENIGN MAMMARY DYSPLASIA OF LEFT BREAST: ICD-10-CM

## 2025-09-03 DIAGNOSIS — Z80.3 FAMILY HISTORY OF MALIGNANT NEOPLASM OF BREAST: ICD-10-CM

## 2025-09-03 PROCEDURE — 99214 OFFICE O/P EST MOD 30 MIN: CPT

## 2025-09-03 PROCEDURE — 93702 BIS XTRACELL FLUID ANALYSIS: CPT | Mod: NC

## 2025-09-09 ENCOUNTER — APPOINTMENT (OUTPATIENT)
Dept: DERMATOLOGY | Facility: CLINIC | Age: 55
End: 2025-09-09
Payer: MEDICAID

## 2025-09-09 DIAGNOSIS — L82.1 OTHER SEBORRHEIC KERATOSIS: ICD-10-CM

## 2025-09-09 DIAGNOSIS — D22.9 MELANOCYTIC NEVI, UNSPECIFIED: ICD-10-CM

## 2025-09-09 DIAGNOSIS — D18.01 HEMANGIOMA OF SKIN AND SUBCUTANEOUS TISSUE: ICD-10-CM

## 2025-09-09 PROCEDURE — 99203 OFFICE O/P NEW LOW 30 MIN: CPT

## (undated) DEVICE — BLADE PHOTON 7.5IN / 10.5IN

## (undated) DEVICE — DRSG GAUZE FLUFF 1PLY 18X30"

## (undated) DEVICE — PACK UPPER BODY

## (undated) DEVICE — DRSG GAUZE SPONGE 2X2" STERILE

## (undated) DEVICE — Device

## (undated) DEVICE — ABDOMINAL BINDER MED/LG 12" X 45"-62"

## (undated) DEVICE — TUBING MICROAIRE ASPIRATION SET 12FT

## (undated) DEVICE — NDL HYPO REGULAR BEVEL 25G X 1.5" (BLUE)

## (undated) DEVICE — NORMOFLO IRRIGATING SET 500MM/HG

## (undated) DEVICE — TUBING BLUE CAP M/F

## (undated) DEVICE — BAG SPONGE COUNTER EZ

## (undated) DEVICE — DRAPE MAGNETIC INSTRUMENT MEDIUM

## (undated) DEVICE — VENODYNE/SCD SLEEVE CALF MEDIUM

## (undated) DEVICE — STAPLER SKIN PROXIMATE

## (undated) DEVICE — DRAIN RESERVOIR FOR JACKSON PRATT 100CC CARDINAL

## (undated) DEVICE — DRSG MASTISOL

## (undated) DEVICE — PACK BREAST RECONSTRUCTION

## (undated) DEVICE — WARMING BLANKET LOWER ADULT

## (undated) DEVICE — POSITIONER FOAM EGG CRATE ULNAR 2PCS (PINK)

## (undated) DEVICE — DRSG COMBINE 5X9"

## (undated) DEVICE — SUT MONOCRYL 4-0 18" PS-2

## (undated) DEVICE — ELCTR STRYKER NEPTUNE SMOKE EVACUATION PENCIL (GREEN)

## (undated) DEVICE — SUT PLAIN GUT FAST ABSORBING 5-0 PC-1

## (undated) DEVICE — KELLER FUNNEL

## (undated) DEVICE — SUT SILK 2-0 30" PSL

## (undated) DEVICE — WARMING BLANKET FULL UNDERBODY

## (undated) DEVICE — MARKING PEN W RULER

## (undated) DEVICE — DRSG TELFA 3 X 8

## (undated) DEVICE — GLV 7.5 PROTEXIS (WHITE)

## (undated) DEVICE — SUT MONOCRYL 4-0 18" P-3 UNDYED

## (undated) DEVICE — SYS RESOLVE FAT PROCESSING 1 UNIT

## (undated) DEVICE — SLV COMPRESSION KNEE MED

## (undated) DEVICE — ELCTR BOVIE TIP BLADE INSULATED 2.75" EDGE

## (undated) DEVICE — SUCTION YANKAUER BULBOUS TIP W VENT

## (undated) DEVICE — SUT QUILL MONODERM 3-0 14CM 18MM

## (undated) DEVICE — SYS REVOLVE ENVI 600

## (undated) DEVICE — DRSG TEGADERM 4X4.75

## (undated) DEVICE — SYR LUER LOK 1CC

## (undated) DEVICE — DRAPE TOWEL BLUE 17" X 24"

## (undated) DEVICE — ELCTR BOVIE TIP BLADE MEGADYNE E-Z CLEAN 6.5" (LONG)

## (undated) DEVICE — PREP CHLORAPREP HI-LITE ORANGE 26ML

## (undated) DEVICE — SYR LUER LOK 5CC

## (undated) DEVICE — PACK LIPECTOMY

## (undated) DEVICE — SUT ENSEAL CVD 14CM

## (undated) DEVICE — ELCTR BOVIE PENCIL SMOKE EVACUATION

## (undated) DEVICE — SUT MONOCRYL 3-0 18" PS-2 UNDYED

## (undated) DEVICE — SUT NYLON 11-0 4" DRM4

## (undated) DEVICE — SUT MONOCRYL PLUS 4-0 18" PS-2 UNDYED

## (undated) DEVICE — BLADE SCALPEL SAFETY #11 WITH PLASTIC GREEN HANDLE

## (undated) DEVICE — SUT VICRYL 3-0 18" PS-2 UNDYED

## (undated) DEVICE — DRSG STERISTRIPS 1 X 5"

## (undated) DEVICE — SPONGE PEANUT AUTO COUNT

## (undated) DEVICE — ADAPTER LUER LOCK TO LUER LOCK

## (undated) DEVICE — SIZER BREAST GEL STYL FX 450CC

## (undated) DEVICE — SUT SILK 3-0 18" TIES

## (undated) DEVICE — BLADE SURGICAL #15 CARBON

## (undated) DEVICE — DRSG STERISTRIPS 0.5 X 4"

## (undated) DEVICE — SYR LUER LOK 20CC

## (undated) DEVICE — DRSG GAUZE MOISTURIZER 0.5 OZ 4X8

## (undated) DEVICE — SUT ETHILON 5-0 18" P-3

## (undated) DEVICE — DRAIN JACKSON PRATT 7MM FLAT FULL NO TROCAR

## (undated) DEVICE — SUT VICRYL 2-0 27" CT-1 UNDYED

## (undated) DEVICE — SPECIMEN CONTAINER 32OZ W LID

## (undated) DEVICE — ONETRAC LIGHTED RETRACTOR 135 X 30MM DISP

## (undated) DEVICE — PREP BETADINE SPONGE STICKS

## (undated) DEVICE — SYR CONTROL LUER LOK 10CC

## (undated) DEVICE — SYR LUER LOK 10CC

## (undated) DEVICE — BIPOLAR FORCEP SYMMETRY BAYONET STR 8.25" X 1.5MM (BLUE)

## (undated) DEVICE — BIPOLAR FORCEP SYMMETRY JEWELERS SMOOTH NONLOCK 4.5"

## (undated) DEVICE — DRAPE CAMERA VIDEO 7"X96"

## (undated) DEVICE — GLV 6.5 PROTEXIS W HYDROGEL

## (undated) DEVICE — SIZER BREAST GEL STYL FX 485CC

## (undated) DEVICE — DRSG ACE BANDAGE 6"